# Patient Record
Sex: FEMALE | Race: WHITE
[De-identification: names, ages, dates, MRNs, and addresses within clinical notes are randomized per-mention and may not be internally consistent; named-entity substitution may affect disease eponyms.]

---

## 2017-02-21 ENCOUNTER — HOSPITAL ENCOUNTER (OUTPATIENT)
Dept: HOSPITAL 45 - C.RAD1850 | Age: 82
Discharge: HOME | End: 2017-02-21
Attending: INTERNAL MEDICINE
Payer: COMMERCIAL

## 2017-02-21 DIAGNOSIS — E55.9: Primary | ICD-10-CM

## 2017-02-21 DIAGNOSIS — M62.81: ICD-10-CM

## 2017-02-21 DIAGNOSIS — M15.9: ICD-10-CM

## 2017-02-21 DIAGNOSIS — Z79.52: ICD-10-CM

## 2017-02-21 DIAGNOSIS — M35.3: ICD-10-CM

## 2017-02-21 DIAGNOSIS — R53.81: ICD-10-CM

## 2017-02-21 LAB
BUN SERPL-MCNC: 23 MG/DL (ref 7–18)
CREAT SERPL-MCNC: 1.1 MG/DL (ref 0.6–1.2)

## 2017-02-21 NOTE — DIAGNOSTIC IMAGING REPORT
THORACIC SPINE 3 VIEWS ROUTINE



CLINICAL HISTORY: Polymyalgia rheumatica, long-term steroid use    



COMPARISON STUDY:  No previous studies for comparison.



FINDINGS: There are mild to moderate multilevel degenerative changes. No acute

fractures are visualized. The paraspinal line is not displaced. There is a minor

spinal curvature convex to the right. There is widening of the prevertebral soft

tissues and cervical level. A neck mass cannot be excluded.



IMPRESSION: 

1. Multilevel degenerative change. No acute fractures are visualized on

conventional radiographic imaging. 

2. Widening of the prevertebral soft tissues at the mid cervical level. This

could be indirect evidence of a neck mass. CT scanning could be obtained in

follow-up as deemed clinically indicated.









Electronically signed by:  Kyler Champion M.D.

2/21/2017 9:30 AM



Dictated Date/Time:  2/21/2017 9:25 AM

## 2017-02-22 LAB
ALBUMIN SERPL-MCNC: 3.9 G/DL (ref 3.8–4.8)
BETA-2-GLOBULIN: 0.4 G/DL (ref 0.2–0.5)
ELECTROPHORESIS INTERPRET: (no result)
GAMMA GLOB SERPL ELPH-MCNC: 0.6 G/DL (ref 0.8–1.7)
PROT SERPL-MCNC: 6.6 G/DL (ref 6.2–8.3)

## 2017-02-27 ENCOUNTER — HOSPITAL ENCOUNTER (OUTPATIENT)
Dept: HOSPITAL 45 - C.CTS | Age: 82
Discharge: HOME | End: 2017-02-27
Attending: INTERNAL MEDICINE
Payer: COMMERCIAL

## 2017-02-27 DIAGNOSIS — R22.1: Primary | ICD-10-CM

## 2017-02-27 NOTE — DIAGNOSTIC IMAGING REPORT
CT soft tissue neck SOFT TISSUE NECK WITH



CLINICAL HISTORY: R22.1 Neck [id number] abnormal cervical thoracic images.

Mass.



TECHNIQUE: Transaxial acquisition with multi axial reformatted images



COMPARISON STUDY:  2/21/2017



FINDINGS: Patient salivary glands are unremarkable. This includes parotid and

submandibular glands.



Prevertebral soft tissues showed no evidence for abnormal mass or collection.

The plain film findings most likely are on the basis of technical and/or

positional artifact. Thyroid appears to be symmetric. There is some nodularity

of the superior aspects of the thyroid lobes bilaterally.



There is no significant cervical adenopathy. There is no airway compromise.



IMPRESSION:  Negative study. Soft tissue prominence in the prevertebral mid

cervical region appears to be secondary to overlap or positional artifact. 









Electronically signed by:  Amadou Castro M.D.

2/27/2017 11:47 AM



Dictated Date/Time:  2/27/2017 11:43 AM

## 2017-03-17 ENCOUNTER — HOSPITAL ENCOUNTER (OUTPATIENT)
Dept: HOSPITAL 45 - C.LAB1850 | Age: 82
Discharge: HOME | End: 2017-03-17
Attending: INTERNAL MEDICINE
Payer: COMMERCIAL

## 2017-03-17 DIAGNOSIS — E55.9: Primary | ICD-10-CM

## 2017-03-17 DIAGNOSIS — Z79.52: ICD-10-CM

## 2017-03-17 DIAGNOSIS — R29.898: ICD-10-CM

## 2017-03-17 DIAGNOSIS — M15.9: ICD-10-CM

## 2017-03-17 DIAGNOSIS — M65.88: ICD-10-CM

## 2017-03-17 DIAGNOSIS — R60.9: ICD-10-CM

## 2017-03-17 LAB
ALP SERPL-CCNC: 52 U/L (ref 45–117)
ALT SERPL-CCNC: 14 U/L (ref 12–78)
AST SERPL-CCNC: 8 U/L (ref 15–37)
BASOPHILS # BLD: 0.04 K/UL (ref 0–0.2)
BASOPHILS NFR BLD: 0.3 %
COMPLETE: YES
CREAT SERPL-MCNC: 1.1 MG/DL (ref 0.6–1.2)
EOSINOPHIL NFR BLD AUTO: 371 K/UL (ref 130–400)
HCT VFR BLD CALC: 30.1 % (ref 37–47)
IG%: 0.5 %
IMM GRANULOCYTES NFR BLD AUTO: 5.7 %
LYMPHOCYTES # BLD: 0.85 K/UL (ref 1.2–3.4)
MCH RBC QN AUTO: 26.5 PG (ref 25–34)
MCHC RBC AUTO-ENTMCNC: 31.2 G/DL (ref 32–36)
MCV RBC AUTO: 84.8 FL (ref 80–100)
MONOCYTES NFR BLD: 2.7 %
NEUTROPHILS # BLD AUTO: 0.5 %
NEUTROPHILS NFR BLD AUTO: 90.3 %
PMV BLD AUTO: 10.4 FL (ref 7.4–10.4)
RBC # BLD AUTO: 3.55 M/UL (ref 4.2–5.4)
WBC # BLD AUTO: 14.91 K/UL (ref 4.8–10.8)

## 2017-05-04 ENCOUNTER — HOSPITAL ENCOUNTER (OUTPATIENT)
Dept: HOSPITAL 45 - C.RAD1850 | Age: 82
Discharge: HOME | End: 2017-05-04
Attending: INTERNAL MEDICINE
Payer: COMMERCIAL

## 2017-05-04 DIAGNOSIS — M79.643: ICD-10-CM

## 2017-05-04 DIAGNOSIS — M65.88: ICD-10-CM

## 2017-05-04 DIAGNOSIS — M85.841: ICD-10-CM

## 2017-05-04 DIAGNOSIS — E55.9: Primary | ICD-10-CM

## 2017-05-04 NOTE — DIAGNOSTIC IMAGING REPORT
RIGHT HIP UNILATERAL 2 VIEWS



CLINICAL HISTORY: Right hip pain. Remitting seronegative symmetrical synovitis

with pitting edema.



COMPARISON: CT of the abdomen and pelvis July 11, 2016 and pelvis radiograph

January 20, 2016.



FINDINGS:  Alignment of the right hip is anatomic. There is no acute fracture or

joint space is preserved. There is mild osteophytosis. There is no evidence of

avascular necrosis of the right femoral head on this exam. Vascular

calcification is noted. There are soft tissue calcification of the proximal

right thigh which is chronic.



IMPRESSION: 



1. Mild osteoarthritis of the right hip. No radiographic evidence of avascular

necrosis.



2. No acute fracture.







Electronically signed by:  Dani Tate M.D.

5/4/2017 12:26 PM



Dictated Date/Time:  5/4/2017 12:21 PM

## 2017-05-04 NOTE — DIAGNOSTIC IMAGING REPORT
RIGHT HAND 3 VIEWS



CLINICAL HISTORY: Right hand pain.



FINDINGS: 3 views of the right hand are obtained. No prior studies are available

for comparison at the time of dictation. The skeletal structures are osteopenic.

No fracture is seen. There is evidence of erosive osteoarthritis involving the

distal interphalangeal joints, greatest in the second and fifth digits. Mild

osteoarthritic change is present at the first metacarpophalangeal joint and the

proximal interphalangeal joints. Mild arthritic changes also seen at the

carpometacarpal joint. Moderate arthritic changes seen involving the radial

carpal row. The overlying soft tissues are within normal limits.



IMPRESSION: Osteopenia end arthritic change as above. Erosive arthritis is noted

involving the distal interphalangeal joints.







Electronically signed by:  Erasto Rausch M.D.

5/4/2017 12:23 PM



Dictated Date/Time:  5/4/2017 12:21 PM

## 2017-06-03 ENCOUNTER — HOSPITAL ENCOUNTER (EMERGENCY)
Dept: HOSPITAL 45 - C.EDB | Age: 82
Discharge: HOME | End: 2017-06-03
Payer: COMMERCIAL

## 2017-06-03 VITALS
DIASTOLIC BLOOD PRESSURE: 63 MMHG | SYSTOLIC BLOOD PRESSURE: 149 MMHG | OXYGEN SATURATION: 96 % | TEMPERATURE: 98.96 F | HEART RATE: 77 BPM

## 2017-06-03 VITALS
WEIGHT: 170.42 LBS | BODY MASS INDEX: 33.46 KG/M2 | BODY MASS INDEX: 33.46 KG/M2 | WEIGHT: 170.42 LBS | HEIGHT: 60 IN | HEIGHT: 60 IN

## 2017-06-03 DIAGNOSIS — M19.90: ICD-10-CM

## 2017-06-03 DIAGNOSIS — Z88.3: ICD-10-CM

## 2017-06-03 DIAGNOSIS — I10: ICD-10-CM

## 2017-06-03 DIAGNOSIS — Z79.899: ICD-10-CM

## 2017-06-03 DIAGNOSIS — T50.905A: Primary | ICD-10-CM

## 2017-06-03 DIAGNOSIS — Z88.2: ICD-10-CM

## 2017-06-03 DIAGNOSIS — Z82.49: ICD-10-CM

## 2017-06-03 DIAGNOSIS — M54.14: ICD-10-CM

## 2017-06-03 DIAGNOSIS — D64.9: ICD-10-CM

## 2017-06-03 LAB
ALBUMIN/GLOB SERPL: 1.1 {RATIO} (ref 0.9–2)
ALP SERPL-CCNC: 53 U/L (ref 45–117)
ALT SERPL-CCNC: 12 U/L (ref 12–78)
ANION GAP SERPL CALC-SCNC: 9 MMOL/L (ref 3–11)
APPEARANCE UR: CLEAR
AST SERPL-CCNC: 15 U/L (ref 15–37)
BASOPHILS # BLD: 0.04 K/UL (ref 0–0.2)
BASOPHILS NFR BLD: 0.3 %
BILIRUB UR-MCNC: (no result) MG/DL
BUN SERPL-MCNC: 20 MG/DL (ref 7–18)
BUN/CREAT SERPL: 13.3 (ref 10–20)
CALCIUM SERPL-MCNC: 8.9 MG/DL (ref 8.5–10.1)
CHLORIDE SERPL-SCNC: 92 MMOL/L (ref 98–107)
CO2 SERPL-SCNC: 31 MMOL/L (ref 21–32)
COLOR UR: YELLOW
COMPLETE: YES
CREAT CL PREDICTED SERPL C-G-VRATE: 27.5 ML/MIN
CREAT SERPL-MCNC: 1.5 MG/DL (ref 0.6–1.2)
EOSINOPHIL NFR BLD AUTO: 340 K/UL (ref 130–400)
GLOBULIN SER-MCNC: 3.1 GM/DL (ref 2.5–4)
GLUCOSE SERPL-MCNC: 107 MG/DL (ref 70–99)
HCT VFR BLD CALC: 27.7 % (ref 37–47)
IG%: 0.4 %
IMM GRANULOCYTES NFR BLD AUTO: 5.1 %
LYMPHOCYTES # BLD: 0.64 K/UL (ref 1.2–3.4)
MANUAL MICROSCOPIC REQUIRED?: NO
MCH RBC QN AUTO: 26.5 PG (ref 25–34)
MCHC RBC AUTO-ENTMCNC: 32.5 G/DL (ref 32–36)
MCV RBC AUTO: 81.7 FL (ref 80–100)
MONOCYTES NFR BLD: 5.3 %
NEUTROPHILS # BLD AUTO: 0 %
NEUTROPHILS NFR BLD AUTO: 88.9 %
NITRITE UR QL STRIP: (no result)
PH UR STRIP: 8 [PH] (ref 4.5–7.5)
PMV BLD AUTO: 10.4 FL (ref 7.4–10.4)
POTASSIUM SERPL-SCNC: 3.4 MMOL/L (ref 3.5–5.1)
RBC # BLD AUTO: 3.39 M/UL (ref 4.2–5.4)
REVIEW REQ?: NO
SODIUM SERPL-SCNC: 132 MMOL/L (ref 136–145)
SP GR UR STRIP: 1.01 (ref 1–1.03)
URINE EPITHELIAL CELL AUTO: (no result) /LPF (ref 0–5)
UROBILINOGEN UR-MCNC: (no result) MG/DL
WBC # BLD AUTO: 12.46 K/UL (ref 4.8–10.8)
ZZURINE CULT IF INDIC CATH: YES

## 2017-06-03 NOTE — EMERGENCY ROOM VISIT NOTE
History


Report prepared by Mio:  Violet Turner


Under the Supervision of:  Dr. Emmett Bailey M.D.


First contact with patient:  13:15


Chief Complaint:  ILLNESS


Stated Complaint:  GENERALIZED WEAKNESS/NAUSEA/VOMITING





History of Present Illness


The patient is an 81 year old female who presents to the Emergency Room with 

complaints of a persistent illness that began 1 week ago.  The patient reports 

that she was recently started on medications for her arthritis.  She states 

that she has been feeling generally weak and has noticed bilateral hand 

swelling.  The patient states that she initially started the medication just 

daily, but states that recently she had been taking the medication twice per 

day.  She states that she developed nausea, vomiting and diarrhea and was 

instructed to stop taking the medication until the diarrhea was under control.  

The patient reports that she took the medication yesterday for the first time 

in several days, but states that she has continued to feel ill today.  She 

associates dry heaving today, but denies any current diarrhea.  The patient 

additionally notes a chills, but denies any abdominal pain or fever.  She 

states that she has been trying to keep up with fluid intake, but states that 

she has only been able to take small sips.





   Source of History:  patient


   Onset:  1 week ago


   Position:  other (global)


   Quality:  other (illness)


   Timing:  other (persistent)


   Associated Symptoms:  + chills, + nausea, + vomiting, + diarrhea, No fevers, 

No abdominal pain





Review of Systems


All systems have been listed, reviewed, and are negative other than those 

previously mentioned. Please see Additional Medical History Sheet.





Past Medical & Surgical


Medical Problems:


(1) Acute kidney injury


(2) Dehydration


(3) Diarrhea


(4) Dizziness


(5) DJD (degenerative joint disease)


(6) Hypertension


(7) Hypokalemia


(8) Syncopal episodes


(9) Thoracic radiculopathy








Family History





Heart disease


Hypertension





Social History


Smoking Status:  Never Smoker


Smokeless Tobacco Use:  No


Alcohol Use:  none


Drug Use:  none


Marital Status:  


Housing Status:  lives with significant other


Occupation Status:  retired





Current/Historical Medications


Scheduled


Cholecalciferol (Vitamin D), 5,000 UNITS PO DAILY


Hydrochlorothiazide (Hydrochlorothiazide), 25 MG PO DAILY


Hydroxychloroquine Sulfate (Hydroxychloroquine Sulfat), 200 MG PO BID


Lansoprazole (Prevacid), 30 MG PO DAILY


Levothyroxine Sodium (Synthroid), 75 MCG PO DAILY


Lisinopril (Prinivil), 20 MG PO BID


Melatonin (Kp Melatonin), 6 MG PO HS


Prednisone (Prednisone), 3 MG PO QAM


Prednisone (Prednisone), 5 MG PO DAILY


Sertraline (Zoloft), 25 MG PO HS


Verapamil Hcl (Verapamil Hcl Er), 120 MG PO DAILY





Allergies


Coded Allergies:  


     Sulfa Antibiotics (Verified  Allergy, Intermediate, RASH, 6/3/17)


     Erythromycin (Verified  Adverse Reaction, Unknown, N&V, 6/3/17)


 N&V





Physical Exam


Vital Signs











  Date Time  Temp Pulse Resp B/P (MAP) Pulse Ox O2 Delivery O2 Flow Rate FiO2


 


6/3/17 18:35 37.2 77 18 149/63 96   


 


6/3/17 17:43  78 16  96   


 


6/3/17 17:38  80 16  95   


 


6/3/17 17:33  82 17  97   


 


6/3/17 17:32    149/63    


 


6/3/17 17:28  81 22  94   


 


6/3/17 17:23  84 23  98   


 


6/3/17 17:18  83 18  93   


 


6/3/17 17:13  83 20  94   


 


6/3/17 17:08  81 19  93   


 


6/3/17 17:03  80 17  94   


 


6/3/17 17:02    157/56    


 


6/3/17 16:58  78 16  95   


 


6/3/17 16:53  79 18  94   


 


6/3/17 16:48  78 22  96   


 


6/3/17 16:43  77 15  98   


 


6/3/17 16:38  86 20  95   


 


6/3/17 16:33  87 22  94   


 


6/3/17 16:32    175/82    


 


6/3/17 16:28  92 24  95   


 


6/3/17 16:23  80 16  93   


 


6/3/17 16:18  80 17  97   


 


6/3/17 16:13  81 18  92   


 


6/3/17 16:08  81 18  93   


 


6/3/17 16:03  82 21  89   


 


6/3/17 16:02    162/58    


 


6/3/17 15:58  79 16  93   


 


6/3/17 15:53  82 19  91   


 


6/3/17 15:48  79 21  99   


 


6/3/17 15:43  77 13  98   


 


6/3/17 15:38  81 22  99   


 


6/3/17 15:33  81 19  91   


 


6/3/17 15:32    164/58    


 


6/3/17 15:28  79 20  92   


 


6/3/17 15:23  81 17  91   


 


6/3/17 15:19    159/67    


 


6/3/17 15:18  78 17  96   


 


6/3/17 15:13  86 17  87   


 


6/3/17 15:08  84 17  85   


 


6/3/17 15:03 37.2 91 19  85   


 


6/3/17 14:58  89 19  86   


 


6/3/17 14:53  90 19  81   


 


6/3/17 14:48  85 17  83   


 


6/3/17 14:43  82 15  85   


 


6/3/17 14:38  75 22  97   


 


6/3/17 14:33  75 19  94   


 


6/3/17 14:28  74 18  99   


 


6/3/17 14:23  72 20  94   


 


6/3/17 14:18  70 20  97   


 


6/3/17 14:13  68 17  98   


 


6/3/17 14:08  73 20  100   


 


6/3/17 14:03  72 22  98   


 


6/3/17 13:58  81 23  99   


 


6/3/17 13:53  78 16  100   


 


6/3/17 13:48  68 17 147/70 97   


 


6/3/17 13:43  67 17  98   


 


6/3/17 13:38  70 17  96   


 


6/3/17 13:33  69 17  94   


 


6/3/17 13:28  69 15  98   


 


6/3/17 13:23  70 22  99   


 


6/3/17 13:18  69 19  99   


 


6/3/17 13:13  73 21  96   


 


6/3/17 13:08  69 15  99   


 


6/3/17 13:03  68 21  99   


 


6/3/17 13:02  71      


 


6/3/17 12:47 36.5 70 18 163/102 99 Room Air  


 


6/3/17 12:35    163/102    











Physical Exam


GENERAL: Patient awake, alert, oriented x 3.  Patient appears mildly 

dehydrated.  Patient follows commands.  Patient does not appear toxic.  Patient 

is well-nourished.  


SKIN: No erythema, pallor, cyanosis or rash


HEENT: Normal head, pupils equal, reactive to light and accommodation.  Ears 

normal.  Mucous membranes appear slightly dry.  Oral cavity and posterior 

pharynx appear normal.  Neck: Without adenopathy, no neck vein distention.


LUNGS: Clear to auscultation.  No wheezes, no rales, no rhonchi.


HEART:  No murmurs. No gallops. No rubs


ABDOMEN: Obese, No masses, no rebound, no hepatomegaly or splenomegaly.


EXTREMITIES: Patient has some generalized swelling of hands consistent with 

chronic rheumatoid arthritis, 2+ nonpitting pretibial edema.


NEUROLOGIC: Cranial nerves II-XII within normal limits.  No gross motor sensory 

function deficits.





Medical Decision & Procedures


Laboratory Results


6/3/17 13:10








Red Blood Count 3.39, Mean Corpuscular Volume 81.7, Mean Corpuscular Hemoglobin 

26.5, Mean Corpuscular Hemoglobin Concent 32.5, Mean Platelet Volume 10.4, 

Neutrophils (%) (Auto) 88.9, Lymphocytes (%) (Auto) 5.1, Monocytes (%) (Auto) 

5.3, Eosinophils (%) (Auto) 0.0, Basophils (%) (Auto) 0.3, Neutrophils # (Auto) 

11.07, Lymphocytes # (Auto) 0.64, Monocytes # (Auto) 0.66, Eosinophils # (Auto) 

0.00, Basophils # (Auto) 0.04





6/3/17 13:10

















Test


  6/3/17


13:10 6/3/17


14:00 6/3/17


14:12


 


White Blood Count


  12.46 K/uL


(4.8-10.8) 


  


 


 


Red Blood Count


  3.39 M/uL


(4.2-5.4) 


  


 


 


Hemoglobin


  9.0 g/dL


(12.0-16.0) 


  


 


 


Hematocrit 27.7 % (37-47)   


 


Mean Corpuscular Volume


  81.7 fL


() 


  


 


 


Mean Corpuscular Hemoglobin


  26.5 pg


(25-34) 


  


 


 


Mean Corpuscular Hemoglobin


Concent 32.5 g/dl


(32-36) 


  


 


 


Platelet Count


  340 K/uL


(130-400) 


  


 


 


Mean Platelet Volume


  10.4 fL


(7.4-10.4) 


  


 


 


Neutrophils (%) (Auto) 88.9 %   


 


Lymphocytes (%) (Auto) 5.1 %   


 


Monocytes (%) (Auto) 5.3 %   


 


Eosinophils (%) (Auto) 0.0 %   


 


Basophils (%) (Auto) 0.3 %   


 


Neutrophils # (Auto)


  11.07 K/uL


(1.4-6.5) 


  


 


 


Lymphocytes # (Auto)


  0.64 K/uL


(1.2-3.4) 


  


 


 


Monocytes # (Auto)


  0.66 K/uL


(0.11-0.59) 


  


 


 


Eosinophils # (Auto)


  0.00 K/uL


(0-0.5) 


  


 


 


Basophils # (Auto)


  0.04 K/uL


(0-0.2) 


  


 


 


RDW Standard Deviation


  44.0 fL


(36.4-46.3) 


  


 


 


RDW Coefficient of Variation


  14.6 %


(11.5-14.5) 


  


 


 


Immature Granulocyte % (Auto) 0.4 %   


 


Immature Granulocyte # (Auto)


  0.05 K/uL


(0.00-0.02) 


  


 


 


Anion Gap


  9.0 mmol/L


(3-11) 


  


 


 


Est Creatinine Clear Calc


Drug Dose 27.5 ml/min 


  


  


 


 


Estimated GFR (


American) 37.7 


  


  


 


 


Estimated GFR (Non-


American 32.6 


  


  


 


 


BUN/Creatinine Ratio 13.3 (10-20)   


 


Calcium Level


  8.9 mg/dl


(8.5-10.1) 


  


 


 


Total Bilirubin


  0.4 mg/dl


(0.2-1) 


  


 


 


Aspartate Amino Transf


(AST/SGOT) 15 U/L (15-37) 


  


  


 


 


Alanine Aminotransferase


(ALT/SGPT) 12 U/L (12-78) 


  


  


 


 


Alkaline Phosphatase


  53 U/L


() 


  


 


 


Total Protein


  6.5 gm/dl


(6.4-8.2) 


  


 


 


Albumin


  3.4 gm/dl


(3.4-5.0) 


  


 


 


Globulin


  3.1 gm/dl


(2.5-4.0) 


  


 


 


Albumin/Globulin Ratio 1.1 (0.9-2)   


 


Urine Color  YELLOW  


 


Urine Appearance  CLEAR (CLEAR)  


 


Urine pH  8.0 (4.5-7.5)  


 


Urine Specific Gravity


  


  1.006


(1.000-1.030) 


 


 


Urine Protein  NEG (NEG)  


 


Urine Glucose (UA)  NEG (NEG)  


 


Urine Ketones  NEG (NEG)  


 


Urine Occult Blood  NEG (NEG)  


 


Urine Nitrite  POS (NEG)  


 


Urine Bilirubin  NEG (NEG)  


 


Urine Urobilinogen  NEG (NEG)  


 


Urine Leukocyte Esterase  SMALL (NEG)  


 


Urine WBC (Auto)


  


  5-10 /hpf


(0-5) 


 


 


Urine RBC (Auto)  0-4 /hpf (0-4)  


 


Urine Hyaline Casts (Auto)  0 /lpf (0-5)  


 


Urine Epithelial Cells (Auto)


  


  20-30 /lpf


(0-5) 


 


 


Urine Bacteria (Auto)  4+ (NEG)  


 


Influenza Type A Antigen


  


  


  Neg for Influ


A (NEG)


 


Influenza Type B Antigen


  


  


  Neg for Influ


B (NEG)








Laboratory results as stated above per my review.





Medications Administered











 Medications


  (Trade)  Dose


 Ordered  Sig/Robinson


 Route  Start Time


 Stop Time Status Last Admin


Dose Admin


 


 Sodium Chloride  1,000 ml @ 


 1,000 mls/hr  Q1H ONCE


 IV  6/3/17 13:30


 6/3/17 14:29 DC 6/3/17 13:30


1,000 MLS/HR


 


 Ondansetron HCl


  (Zofran Inj)  4 mg  Q1HWA  PRN


 IV  6/3/17 13:30


 6/3/17 18:55 DC 6/3/17 14:04


4 MG











ECG


Indication:  weakness


Rate (beats per minute):  70


Rhythm:  sinus rhythm


Findings:  no acute ischemic change, no ectopy, other (short ID interval)





ED Course


1316: Past medical records reviewed. The patient was evaluated in room B7. A 

complete history and physical examination was performed. 





1330: Ordered Zofran Inj 4 mg IV, Sodium Chloride 1000 ml @ 1000 mls/hr IV.





1615: I reevaluated the patient and she is feeling better.  She states that her 

nausea has subsided so she is going to try eating and drinking.  





1715: I reevaluated the patient and she is doing well.  I discussed all the 

exam findings with her and I discussed the treatment plan.  She verbalized 

complete understanding and agreement.  She is ready to go home.





Medical Decision


Nurses notes reviewed. Medical history sheet reviewed. Differential diagnosis 

includes but is not limited to: medication reaction, gastritis, gastroenteritis

, dehydration, metabolic disorder.





Medication Reconciliation: I attest that I have personally reviewed the patient'

s current medication list.





Blood Pressure Screening: Patient was found to have an elevated blood pressure 

and was referred to their primary doctor for recheck and further treatment.





The patient was given medication for nausea..  She was able to drink and eat 

prior to discharge.  I believe her symptoms are related to the 

hydroxychloroquine.  Labs, EKG and imaging were evaluated.  Please see above. 

The patient will stop the hydroxychloroquine..  She is to follow up with her 

rheumatologist within the next 3 days.





Impression





 Primary Impression:  


 Medication reaction


 Additional Impression:  


 Anemia





Scribe Attestation


The scribe's documentation has been prepared under my direction and personally 

reviewed by me in its entirety. I confirm that the note above accurately 

reflects all work, treatment, procedures, and medical decision making performed 

by me.





Departure Information


Dispostion


Home / Self-Care





Referrals


García Thomas D.O. (PCP)








Aileen Coppola MD





Forms


HOME CARE DOCUMENTATION FORM,                                                 

               IMPORTANT VISIT INFORMATION





Patient Instructions


My [facility]





Additional Instructions





Stop hydroxychloroquine.


Continue all of your other medications as prescribed. 


Follow up with your rheumatologist on Tuesday. 


Drink extra fluids.





Problem Qualifiers

## 2017-06-05 ENCOUNTER — HOSPITAL ENCOUNTER (INPATIENT)
Dept: HOSPITAL 45 - C.EDC | Age: 82
LOS: 8 days | Discharge: NURSING, REHAB OR LONG-TERM CARE | DRG: 372 | End: 2017-06-13
Attending: INTERNAL MEDICINE | Admitting: HOSPITALIST
Payer: COMMERCIAL

## 2017-06-05 VITALS
SYSTOLIC BLOOD PRESSURE: 156 MMHG | HEART RATE: 67 BPM | TEMPERATURE: 98.06 F | OXYGEN SATURATION: 95 % | DIASTOLIC BLOOD PRESSURE: 82 MMHG

## 2017-06-05 VITALS
HEIGHT: 60 IN | BODY MASS INDEX: 34.5 KG/M2 | WEIGHT: 175.71 LBS | HEIGHT: 60 IN | WEIGHT: 175.71 LBS | BODY MASS INDEX: 34.5 KG/M2

## 2017-06-05 VITALS — OXYGEN SATURATION: 98 %

## 2017-06-05 DIAGNOSIS — E87.6: ICD-10-CM

## 2017-06-05 DIAGNOSIS — K58.0: ICD-10-CM

## 2017-06-05 DIAGNOSIS — F32.9: ICD-10-CM

## 2017-06-05 DIAGNOSIS — K21.9: ICD-10-CM

## 2017-06-05 DIAGNOSIS — Z96.659: ICD-10-CM

## 2017-06-05 DIAGNOSIS — M35.3: ICD-10-CM

## 2017-06-05 DIAGNOSIS — E55.9: ICD-10-CM

## 2017-06-05 DIAGNOSIS — E87.1: ICD-10-CM

## 2017-06-05 DIAGNOSIS — B96.20: ICD-10-CM

## 2017-06-05 DIAGNOSIS — M54.14: ICD-10-CM

## 2017-06-05 DIAGNOSIS — R19.5: ICD-10-CM

## 2017-06-05 DIAGNOSIS — Z90.710: ICD-10-CM

## 2017-06-05 DIAGNOSIS — E66.9: ICD-10-CM

## 2017-06-05 DIAGNOSIS — M06.9: ICD-10-CM

## 2017-06-05 DIAGNOSIS — Z88.2: ICD-10-CM

## 2017-06-05 DIAGNOSIS — Z79.899: ICD-10-CM

## 2017-06-05 DIAGNOSIS — Z82.49: ICD-10-CM

## 2017-06-05 DIAGNOSIS — N39.0: ICD-10-CM

## 2017-06-05 DIAGNOSIS — Z79.52: ICD-10-CM

## 2017-06-05 DIAGNOSIS — Z88.0: ICD-10-CM

## 2017-06-05 DIAGNOSIS — Z66: ICD-10-CM

## 2017-06-05 DIAGNOSIS — T50.905A: ICD-10-CM

## 2017-06-05 DIAGNOSIS — M19.90: ICD-10-CM

## 2017-06-05 DIAGNOSIS — R32: ICD-10-CM

## 2017-06-05 DIAGNOSIS — E03.9: ICD-10-CM

## 2017-06-05 DIAGNOSIS — A04.7: Primary | ICD-10-CM

## 2017-06-05 DIAGNOSIS — D64.9: ICD-10-CM

## 2017-06-05 DIAGNOSIS — Z88.3: ICD-10-CM

## 2017-06-05 DIAGNOSIS — K57.30: ICD-10-CM

## 2017-06-05 DIAGNOSIS — I10: ICD-10-CM

## 2017-06-05 DIAGNOSIS — Z88.1: ICD-10-CM

## 2017-06-05 LAB
ALBUMIN/GLOB SERPL: 1 {RATIO} (ref 0.9–2)
ALP SERPL-CCNC: 54 U/L (ref 45–117)
ALT SERPL-CCNC: 13 U/L (ref 12–78)
ANION GAP SERPL CALC-SCNC: 11 MMOL/L (ref 3–11)
AST SERPL-CCNC: 13 U/L (ref 15–37)
BASOPHILS # BLD: 0.03 K/UL (ref 0–0.2)
BASOPHILS NFR BLD: 0.2 %
BUN SERPL-MCNC: 14 MG/DL (ref 7–18)
BUN/CREAT SERPL: 12.1 (ref 10–20)
CALCIUM SERPL-MCNC: 9.1 MG/DL (ref 8.5–10.1)
CHLORIDE SERPL-SCNC: 91 MMOL/L (ref 98–107)
CO2 SERPL-SCNC: 28 MMOL/L (ref 21–32)
COMPLETE: YES
CREAT CL PREDICTED SERPL C-G-VRATE: 34.9 ML/MIN
CREAT SERPL-MCNC: 1.2 MG/DL (ref 0.6–1.2)
EOSINOPHIL NFR BLD AUTO: 344 K/UL (ref 130–400)
GLOBULIN SER-MCNC: 3.4 GM/DL (ref 2.5–4)
GLUCOSE SERPL-MCNC: 112 MG/DL (ref 70–99)
HCT VFR BLD CALC: 29.3 % (ref 37–47)
IG%: 0.2 %
IMM GRANULOCYTES NFR BLD AUTO: 3.9 %
INR PPP: 1 (ref 0.9–1.1)
LYMPHOCYTES # BLD: 0.5 K/UL (ref 1.2–3.4)
MAGNESIUM SERPL-MCNC: 1.8 MG/DL (ref 1.8–2.4)
MCH RBC QN AUTO: 26.4 PG (ref 25–34)
MCHC RBC AUTO-ENTMCNC: 32.4 G/DL (ref 32–36)
MCV RBC AUTO: 81.4 FL (ref 80–100)
MONOCYTES NFR BLD: 4.9 %
NEUTROPHILS # BLD AUTO: 0 %
NEUTROPHILS NFR BLD AUTO: 90.8 %
PARTIAL THROMBOPLASTIN RATIO: 1.1
PMV BLD AUTO: 10.1 FL (ref 7.4–10.4)
POTASSIUM SERPL-SCNC: 3.3 MMOL/L (ref 3.5–5.1)
PROTHROMBIN TIME: 10.9 SECONDS (ref 9–12)
RBC # BLD AUTO: 3.6 M/UL (ref 4.2–5.4)
SODIUM SERPL-SCNC: 130 MMOL/L (ref 136–145)
WBC # BLD AUTO: 12.92 K/UL (ref 4.8–10.8)

## 2017-06-05 RX ADMIN — SERTRALINE HYDROCHLORIDE SCH MG: 50 TABLET, FILM COATED ORAL at 21:50

## 2017-06-05 RX ADMIN — SODIUM CHLORIDE SCH MLS/HR: 900 INJECTION, SOLUTION INTRAVENOUS at 19:37

## 2017-06-05 RX ADMIN — LISINOPRIL SCH MG: 20 TABLET ORAL at 21:50

## 2017-06-05 RX ADMIN — HEPARIN SODIUM SCH UNIT: 10000 INJECTION, SOLUTION INTRAVENOUS; SUBCUTANEOUS at 21:52

## 2017-06-05 NOTE — DIAGNOSTIC IMAGING REPORT
CT SCAN OF THE ABDOMEN AND PELVIS WITHOUT IV CONTRAST



CLINICAL HISTORY:   Nausea and vomiting. Diarrhea.



COMPARISON STUDY:  Abdominal CT dated 7/11/2016.



TECHNIQUE: CT scan of the abdomen and pelvis is performed from the lung bases to

the proximal femora. Images are reviewed in the axial, sagittal, and coronal

planes. IV contrast was not administered for this examination as per the

referring clinician. Note that examination is significant suboptimal without

oral and IV contrast. The examination is also degraded by motion artifact.

Automated dose control exposure was utilized.



CT DOSE: 938.09 mGy.cm



FINDINGS:



Lung bases: The heart is top normal in size and without pericardial effusion.

There is diminished attenuation of the cardiac blood pool as compared to the

myocardium suggesting anemia. Coronary artery calcifications are noted. The lung

bases are clear.



Liver: The unenhanced liver is normal in size, contour, and attenuation. There

is no intrahepatic biliary ductal dilatation.



Gallbladder: Unremarkable.



Spleen: Normal in size and attenuation.



Pancreas: There is fatty atrophy of the pancreas which is grossly unremarkable.



Adrenal glands: Unremarkable.



Kidneys: The unenhanced kidneys are atrophic and without hydronephrosis. There

are no renal calculi identified. There is no evidence of contour deforming renal

mass lesion.



Abdominal vasculature: The abdominal aorta is normal in course and caliber

noting moderate atherosclerotic calcification.



Bowel: There is no bowel obstruction. There is moderate sigmoid diverticulosis

without CT evidence of acute diverticulitis. Submucosal fat deposition is

incidentally noted in the cecum. The appendix is  well-visualized and normal.



Peritoneum: There is no intraperitoneal free air or abdominal ascites.



Lymphadenopathy: None.



Pelvic viscera: The bladder is normal as visualized. The uterus is surgically

absent. No adnexal lesion is seen. There is nonspecific induration of the

perianal soft tissues.



Skeletal structures: The skeletal structures are osteopenic. No lytic or blastic

lesions are seen.





IMPRESSION:



1. Suboptimal examination without oral and IV contrast.



2. There are no acute infectious or inflammatory findings in the abdomen or

pelvis.



3. There is nonspecific induration of the perianal soft tissues. This is of

indeterminant etiology and significance. Correlation with physical examination

findings is recommended.



4. Moderate sigmoid diverticulosis without CT evidence of acute diverticulitis.







Electronically signed by:  Erasto Rausch M.D.

6/5/2017 3:01 PM



Dictated Date/Time:  6/5/2017 2:55 PM

## 2017-06-05 NOTE — EMERGENCY ROOM VISIT NOTE
History


First contact with patient:  13:37


Chief Complaint:  ILLNESS


Stated Complaint:  N/V/D





History of Present Illness


Patient is an 81-year-old white female with past medical history including GERD

, hypothyroidism, hypertension, depression, PMR, degenerative joint disease, 

arthritis, vitamin D deficiency, chronic urinary incontinence, among others, 

who is brought back to the emergency department by ALS ambulance from home for 

evaluation of nausea, vomiting, diarrhea and weakness 2 days.  Patient was 

seen here 2 days ago for the same complaint.  Patient provides the history, is 

supplemented thumb by the daughter.  She has been having trouble for several 

weeks, they thought it was related to a new medication, hydroxychloroquine, 

which was started for her arthritic process.  She had started it last week, and 

developed some GI symptoms which were alleviated when she stopped the 

medication.  She took it again last Friday, and again developed nausea, 

vomiting and diarrhea for which she was seen in the emergency department 

Saturday, 6/3.  She was evaluated with laboratory studies, urinalysis and an EKG

, which apparently were unremarkable, and the patient reports that she did not 

feel better upon discharge from our facility.  She was too weak to be able to 

care for herself at home, cannot get out of bed or get to a bathroom without 

significant assistance.  She did feel a little bit better yesterday and was 

able to eat half of the grill cheese sandwich, some mashed potatoes and pasta 

but did feel nauseous after dinner.  Today, she had yogurt and part of a bagel, 

then promptly vomited and had diarrhea 2.  This occurred about 3 hours ago.  

She denies any abdominal pain or distention.  She does have a history of 

diverticulosis, but has never had a bowel obstruction.  She has chronic urinary 

incontinence, wears pads and diapers, and denies any changes in this although 

family does note a small foul-smelling urine.  She has been referred to urology 

for the incontinence and cannot get in until next month.  She has not had any 

fevers.  She denies any back or flank pain.  No chest pain, palpitations or 

shortness of breath.  She has not been on any antibiotics recently.  No sick 

contacts at home.  They have well water with filters as a source at home.  She 

was scheduled to see Dr. Coppola, her rheumatologist in follow-up tomorrow.





Review of Systems


Review of systems as per HPI.  All other systems reviewed were negative.  10 

systems reviewed.





Past Medical/Surgical History


Medical Problems:


(1) Acute kidney injury


(2) Altered mental status


(3) Anemia


(4) Dehydration


(5) Diarrhea


(6) Diverticulosis Colon (W/O Ment Of Hemorrhage)


(7) Dizziness


(8) DJD (degenerative joint disease)


(9) Esophageal Reflux


(10) Headache


(11) Hypertension


(12) Hypokalemia


(13) Hypomagnesemia


(14) Hyponatremia


(15) Hypothyroidism, Unspecified


(16) Irritable Bowel Syndrome


(17) Long-Term(Current)Use Of Steroids


(18) Medication reaction


(19) Nausea & vomiting


(20) Polymyalgia Rheumatica


(21) Renal insufficiency


(22) Rheumatoid Arthritis, Unspecified


(23) SIRS (systemic inflammatory response syndrome)


(24) Syncopal episodes


(25) Syncope


(26) Thoracic radiculopathy


(27) Unspecified Urinary Incontinence


(28) Vitamin D Deficiency, Unspecified


(29) Weakness


Surgical Problems:


(1) History of hysterectomy


(2) Knee Joint Replacement Status


Electronic medical records are reviewed and summarized as above/below.  See 

Problem List.





Family History





Heart disease


Hypertension





Social History


Smoking Status:  Never Smoker


Alcohol Use:  none


Drug Use:  none


Marital Status:  


Housing Status:  lives with family


Occupation Status:  retired





Current/Historical Medications


Scheduled


Cholecalciferol (Vitamin D), 5,000 UNITS PO DAILY


Hydrochlorothiazide (Hydrochlorothiazide), 25 MG PO DAILY


Hydroxychloroquine Sulfate (Hydroxychloroquine Sulfat), 200 MG PO BID


Lansoprazole (Prevacid), 30 MG PO DAILY


Levothyroxine Sodium (Synthroid), 75 MCG PO DAILY


Lisinopril (Prinivil), 20 MG PO BID


Melatonin (Kp Melatonin), 6 MG PO HS


Prednisone (Prednisone), 3 MG PO QAM


Prednisone (Prednisone), 5 MG PO DAILY


Sertraline (Zoloft), 25 MG PO HS


Verapamil Hcl (Verapamil Hcl Er), 120 MG PO DAILY





Allergies


Coded Allergies:  


     Sulfa Antibiotics (Verified  Allergy, Intermediate, RASH, 6/5/17)


     Erythromycin (Verified  Adverse Reaction, Unknown, N&V, 6/5/17)


 N&V





Physical Exam


Vital Signs











  Date Time  Temp Pulse Resp B/P (MAP) Pulse Ox O2 Delivery O2 Flow Rate FiO2


 


6/5/17 17:01     98 Room Air  


 


6/5/17 16:25  70 16 149/87 98 Room Air  


 


6/5/17 15:25  65      


 


6/5/17 15:15  66 16 150/91    


 


6/5/17 13:17 36.5 74 18 135/65 98 Room Air  











Physical Exam


CONSTITUTIONAL: Patient is a well-appearing 81-year-old white female who is 

awake and alert and in no acute distress.  Vital signs are stable.


EYES:  Pupils equal, round, reactive to light and accommodation.  EOMs intact 

without nystagmus.  Sclera are anicteric. 


ENT:  Tympanic membranes intact, with normal landmarks.  External canals are 

clear.  Oral and nasopharynx are clear.  Mucous membranes are moist, no lesions

, tongue and gums appear normal.     


CARDIOVASCULAR: Regular rate and rhythm, with normal S1 and S2, no murmur or 

gallop or rub is heard.  No carotid bruits auscultated.  No JVD.  Peripheral 

pulses easy to palpable.


RESPIRATORY: Breath sounds equal and clear to auscultation without wheezes, 

rales, or rhonchi heard.   Full and equal chest expansion without accessory 

muscle use or retractions. 


GI: Bowel sounds are present.  Abdomen is soft, nontender, nondistended.  No 

organomegaly.  No pulsatile masses.  No guarding or rebound.


MUSCULOSKELETAL: Full range of motion of extremities x 4 with good strength.  

No cyanosis, edema, joint tenderness or swelling.  No deformity.


INTEGUMENTARY: No lesions or rash, normal skin turgor. 


NEUROLOGICAL: Alert, oriented, and cooperative.  Cranial nerves, sensation and 

strength grossly intact.  Pupils round, equal, and react to light, EOMs are 

full.


LYMPH: No lymphadenopathy.





Medical Decision & Procedures


ER Provider


Diagnostic Interpretation:


CT SCAN OF THE ABDOMEN AND PELVIS WITHOUT IV CONTRAST





CLINICAL HISTORY:   Nausea and vomiting. Diarrhea.





COMPARISON STUDY:  Abdominal CT dated 7/11/2016.





TECHNIQUE: CT scan of the abdomen and pelvis is performed from the lung bases to


the proximal femora. Images are reviewed in the axial, sagittal, and coronal


planes. IV contrast was not administered for this examination as per the


referring clinician. Note that examination is significant suboptimal without


oral and IV contrast. The examination is also degraded by motion artifact.


Automated dose control exposure was utilized.





CT DOSE: 938.09 mGy.cm





FINDINGS:





Lung bases: The heart is top normal in size and without pericardial effusion.


There is diminished attenuation of the cardiac blood pool as compared to the


myocardium suggesting anemia. Coronary artery calcifications are noted. The lung


bases are clear.





Liver: The unenhanced liver is normal in size, contour, and attenuation. There


is no intrahepatic biliary ductal dilatation.





Gallbladder: Unremarkable.





Spleen: Normal in size and attenuation.





Pancreas: There is fatty atrophy of the pancreas which is grossly unremarkable.





Adrenal glands: Unremarkable.





Kidneys: The unenhanced kidneys are atrophic and without hydronephrosis. There


are no renal calculi identified. There is no evidence of contour deforming renal


mass lesion.





Abdominal vasculature: The abdominal aorta is normal in course and caliber


noting moderate atherosclerotic calcification.





Bowel: There is no bowel obstruction. There is moderate sigmoid diverticulosis


without CT evidence of acute diverticulitis. Submucosal fat deposition is


incidentally noted in the cecum. The appendix is  well-visualized and normal.





Peritoneum: There is no intraperitoneal free air or abdominal ascites.





Lymphadenopathy: None.





Pelvic viscera: The bladder is normal as visualized. The uterus is surgically


absent. No adnexal lesion is seen. There is nonspecific induration of the


perianal soft tissues.





Skeletal structures: The skeletal structures are osteopenic. No lytic or blastic


lesions are seen.








IMPRESSION:





1. Suboptimal examination without oral and IV contrast.





2. There are no acute infectious or inflammatory findings in the abdomen or


pelvis.





3. There is nonspecific induration of the perianal soft tissues. This is of


indeterminant etiology and significance. Correlation with physical examination


findings is recommended.





4. Moderate sigmoid diverticulosis without CT evidence of acute diverticulitis.





Laboratory Results


6/5/17 14:20








Red Blood Count 3.60, Mean Corpuscular Volume 81.4, Mean Corpuscular Hemoglobin 

26.4, Mean Corpuscular Hemoglobin Concent 32.4, Mean Platelet Volume 10.1, 

Neutrophils (%) (Auto) 90.8, Lymphocytes (%) (Auto) 3.9, Monocytes (%) (Auto) 

4.9, Eosinophils (%) (Auto) 0.0, Basophils (%) (Auto) 0.2, Neutrophils # (Auto) 

11.73, Lymphocytes # (Auto) 0.50, Monocytes # (Auto) 0.63, Eosinophils # (Auto) 

0.00, Basophils # (Auto) 0.03





6/5/17 14:20

















Test


  6/5/17


14:20


 


White Blood Count


  12.92 K/uL


(4.8-10.8)


 


Red Blood Count


  3.60 M/uL


(4.2-5.4)


 


Hemoglobin


  9.5 g/dL


(12.0-16.0)


 


Hematocrit 29.3 % (37-47) 


 


Mean Corpuscular Volume


  81.4 fL


()


 


Mean Corpuscular Hemoglobin


  26.4 pg


(25-34)


 


Mean Corpuscular Hemoglobin


Concent 32.4 g/dl


(32-36)


 


Platelet Count


  344 K/uL


(130-400)


 


Mean Platelet Volume


  10.1 fL


(7.4-10.4)


 


Neutrophils (%) (Auto) 90.8 % 


 


Lymphocytes (%) (Auto) 3.9 % 


 


Monocytes (%) (Auto) 4.9 % 


 


Eosinophils (%) (Auto) 0.0 % 


 


Basophils (%) (Auto) 0.2 % 


 


Neutrophils # (Auto)


  11.73 K/uL


(1.4-6.5)


 


Lymphocytes # (Auto)


  0.50 K/uL


(1.2-3.4)


 


Monocytes # (Auto)


  0.63 K/uL


(0.11-0.59)


 


Eosinophils # (Auto)


  0.00 K/uL


(0-0.5)


 


Basophils # (Auto)


  0.03 K/uL


(0-0.2)


 


RDW Standard Deviation


  43.5 fL


(36.4-46.3)


 


RDW Coefficient of Variation


  14.6 %


(11.5-14.5)


 


Immature Granulocyte % (Auto) 0.2 % 


 


Immature Granulocyte # (Auto)


  0.03 K/uL


(0.00-0.02)


 


Prothrombin Time


  10.9 SECONDS


(9.0-12.0)


 


Prothromb Time International


Ratio 1.0 (0.9-1.1) 


 


 


Activated Partial


Thromboplast Time 29.1 SECONDS


(21.0-31.0)


 


Partial Thromboplastin Ratio 1.1 


 


Anion Gap


  11.0 mmol/L


(3-11)


 


Est Creatinine Clear Calc


Drug Dose 34.9 ml/min 


 


 


Estimated GFR (


American) 49.4 


 


 


Estimated GFR (Non-


American 42.7 


 


 


BUN/Creatinine Ratio 12.1 (10-20) 


 


Calcium Level


  9.1 mg/dl


(8.5-10.1)


 


Magnesium Level


  1.8 mg/dl


(1.8-2.4)


 


Total Bilirubin


  0.4 mg/dl


(0.2-1)


 


Aspartate Amino Transf


(AST/SGOT) 13 U/L (15-37) 


 


 


Alanine Aminotransferase


(ALT/SGPT) 13 U/L (12-78) 


 


 


Alkaline Phosphatase


  54 U/L


()


 


Total Protein


  6.7 gm/dl


(6.4-8.2)


 


Albumin


  3.3 gm/dl


(3.4-5.0)


 


Globulin


  3.4 gm/dl


(2.5-4.0)


 


Albumin/Globulin Ratio 1.0 (0.9-2) 


 


Lipase


  105 U/L


()











Medications Administered











 Medications


  (Trade)  Dose


 Ordered  Sig/Robinson


 Route  Start Time


 Stop Time Status Last Admin


Dose Admin


 


 Sodium Chloride  500 ml @ 


 999 mls/hr  Q31M STAT


 IV  6/5/17 14:01


 6/5/17 14:31 DC 6/5/17 14:40


999 MLS/HR


 


 Sodium Chloride  1,000 ml @ 


 250 mls/hr  Q4H STAT


 IV  6/5/17 14:01


 6/5/17 18:00 DC 6/5/17 15:19


250 MLS/HR


 


 Ceftriaxone Sodium


  (Rocephin Inj)  1 gm  NOW  STAT


 IV  6/5/17 14:12


 6/5/17 14:13 DC 6/5/17 15:19


1 GM


 


 Potassium Chloride


  (Klor-Con Tab)  40 meq  NOW  ONCE


 PO  6/5/17 18:00


 6/5/17 19:21 DC 6/5/17 19:38


40 MEQ


 


 Sodium Chloride  1,000 ml @ 


 125 mls/hr  Q8H


 IV  6/5/17 18:00


 7/5/17 17:59  6/5/17 19:37


125 MLS/HR











ED Course


The patient was seen and assessed as above.  Her old records were reviewed, 

including her ED visit from 2 days ago.  Her urine culture from that time is 

growing Escherichia coli and a gram-negative margie.  This was not treated.





IV lock was initiated.  Patient was hydrated with normal saline solution.  She 

was given 1 g of Rocephin IV.  Laboratory studies were collected including CBC 

with differential CMP, magnesium, and lipase.  The patient was unable to give a 

stool for analysis.  Urinalysis was not repeated as it had just been performed 

2 days ago and she has a positive urine culture.  Given her persistent vomiting 

and diarrhea, CT scan of the abdomen pelvis with no contrast was ordered.





Laboratory studies today revealed a white count of 12,900, consistent with 

prior labs.  Stable anemia, H&H 9.5 and 29.3.  Electrolytes revealed a sodium 

of 130, potassium 3.3, chloride 91, carbon dioxide 28, BUN 14 and creatinine 

1.2.  Liver functions are not elevated.  Magnesium is normal.  Lipase is not 

indicative of acute pancreatitis.





CT scan of the abdomen and pelvis without contrast did not identify any acute 

infectious or inflammatory findings.  There was evidence for sigmoid IV 

particular cyst without evidence for acute diverticulitis.





The patient was reassessed.  She had no further diarrhea or vomiting while in 

the emergency department.  All laboratory and diagnostic imaging studies were 

reviewed with attending physician, and discussed with the ED case manager.  

Given her present vomiting and diarrheal illness in addition to the pain and 

disability from her arthritic condition, the patient is unable to care for 

herself at home, even with assistance of family members.  It was not felt that 

she could be safely discharged home.  Patient was discussed with the [facility] Hospitalist Service for further care and management.





Medical Decision


Differential diagnoses entertained included UTI, cystitis, pyelonephritis, 

renal colic, viral illness, infectious versus inflammatory colitis/enteritis, 

bowel obstruction, perforation, abscess, medication side effect, among others.





Impression





 Primary Impression:  


 Vomiting and diarrhea


 Additional Impressions:  


 Urinary tract infection


 Weakness





Departure Information


Referrals


García Thomas D.O. (PCP)





Patient Instructions


[facility]





Problem Qualifiers

## 2017-06-05 NOTE — HISTORY AND PHYSICAL
History & Physical


Date & Time of Service:


Jun 5, 2017 at 18:08


Chief Complaint:


N/V/D


Primary Care Physician:


García Thomas D.OCarmen


History of Present Illness


Source:  patient, spouse ( at bedside), clinic records, hospital records


This is an 82 y/o female with a history of PMR, RS3PE syndrome, inflammatory 

symmetrical polyarthritis, hypertension, hypothyroidism, irritable bowel 

syndrome, and chronic reflux esophagitis who presented to the ED on 6/5 with 

nausea, vomiting, diarrhea, and weakness.  The patient states that she's been 

having nausea, vomiting, and diarrhea for the last 2 weeks.  She was recently 

started on hydroxychloroquine for her inflammatory arthritis by her 

rheumatologist, Dr. Coppola.  The patient was initially started on 200 mg once 

daily for 1 week, and then her dose was increased to 200 mg BID.  The patient 

had been able to tolerate the lower dosing and saw improvement in her symptoms 

however after increasing her dosage she began to develop N/V/D.  She then 

called Dr. Coppola who advised that she stop the medication until her symptoms 

were under control and then restart at the lower dosage.  The patient stopped 

the medication on May 30 and was feeling better.  She took one dose on June 2 

and then experienced recurrence of her N/V/D.  The last 2 days the patient has 

been feeling very weak in addition to her previous symptoms.  The patient has 

not been able to take care of herself due to her weakness.  The patient also 

complains of swelling and joint pain in her hands and wrists that also limits 

her ability to care of herself.  When the swelling is severe, she also 

complains of numbness and tingling in her hands.  The patient denies fevers, 

chills, sweats, chest pain, palpitations, claudication, cough, wheezing, 

shortness of breath, abdominal pain, dysuria, hematuria, urinary retention, 

paralysis.





Past Medical/Surgical History


Medical Problems:


(1) Acute kidney injury


Status: Resolved  





(2) Altered mental status


Status: Resolved  





(3) Anemia


Status: Chronic  





(4) Dehydration


Status: Resolved  





(5) Diarrhea


Status: Resolved  





(6) Diverticulosis Colon (W/O Ment Of Hemorrhage)


Status: Chronic  





(7) Dizziness


Status: Resolved  





(8) DJD (degenerative joint disease)


Status: Chronic  





(9) Esophageal Reflux


Status: Chronic  





(10) Headache


Status: Resolved  





(11) Hypertension


Status: Chronic  





(12) Hypokalemia


Status: Resolved  





(13) Hypomagnesemia


Status: Resolved  





(14) Hyponatremia


Status: Resolved  





(15) Hypothyroidism, Unspecified


Status: Chronic  





(16) Irritable Bowel Syndrome


Status: Chronic  





(17) Long-Term(Current)Use Of Steroids


Status: Chronic  





(18) Medication reaction


Status: Resolved  





(19) Polymyalgia Rheumatica


Status: Chronic  





(20) Renal insufficiency


Status: Resolved  





(21) Rheumatoid Arthritis, Unspecified


Status: Chronic  





(22) SIRS (systemic inflammatory response syndrome)


Status: Resolved  





(23) Syncopal episodes


Status: Resolved  





(24) Syncope


Status: Resolved  





(25) Thoracic radiculopathy


Status: Resolved  





(26) Unspecified Urinary Incontinence


Status: Chronic  





(27) Vitamin D Deficiency, Unspecified


Status: Chronic  





Surgical Problems:


(1) History of hysterectomy


Status: Resolved  





(2) Knee Joint Replacement Status


Status: Resolved  











Family History





Heart disease


Hypertension





Social History


Smoking Status:  Never Smoker


Smokeless Tobacco Use:  No


Alcohol Use:  none


Drug Use:  none


Marital Status:  


Housing status:  lives with significant other


Occupational Status:  retired





Immunizations


History of Influenza Vaccine:  Yes


History of Tetanus Vaccine?:  Yes


Tetanus Immunization Date:  Jul 9, 2008


History of Pneumococcal:  Yes


Pneumococcal Date:  Jun 9, 2004


History of Hepatitis B Vaccine:  No





Multi-Drug Resistant Organisms


History of MDRO:  No





Allergies


Coded Allergies:  


     Sulfa Antibiotics (Verified  Allergy, Intermediate, RASH, 6/5/17)


     Erythromycin (Verified  Adverse Reaction, Unknown, N&V, 6/5/17)


 N&V





Home Medications


Scheduled


Cholecalciferol (Vitamin D), 5,000 UNITS PO DAILY


Hydrochlorothiazide (Hydrochlorothiazide), 25 MG PO DAILY


Hydroxychloroquine Sulfate (Hydroxychloroquine Sulfat), 200 MG PO BID


Lansoprazole (Prevacid), 30 MG PO DAILY


Levothyroxine Sodium (Synthroid), 75 MCG PO DAILY


Lisinopril (Prinivil), 20 MG PO BID


Melatonin (Kp Melatonin), 6 MG PO HS


Prednisone (Prednisone), 3 MG PO QAM


Prednisone (Prednisone), 5 MG PO DAILY


Sertraline (Zoloft), 25 MG PO HS


Verapamil Hcl (Verapamil Hcl Er), 120 MG PO DAILY





Review of Systems


Constitutional:  + weakness, + fatigue, No fever, No chills, No sweats


Eyes:  No worsening of vision, No eye pain, No diplopia


ENT:  No sore throat, No trouble swallowing


Respiratory:  No cough, No wheezing, No shortness of breath


Cardiovascular:  No chest pain, No claudication, No palpitations


Abdomen:  + nausea, + vomiting, + diarrhea, No pain, No GI bleeding


Musculoskeletal:  + joint pain, + swelling, No calf pain


Genitourinary - Female:  + urinary incontinence (chronic), No dysuria, No 

hematuria


Neurologic:  + numbness/tingling, No paralysis, No weakness


Integumentary:  No rash, No itch, No color change





Physical Exam


Vital Signs











  Date Time  Temp Pulse Resp B/P (MAP) Pulse Ox O2 Delivery O2 Flow Rate FiO2


 


6/5/17 17:01     98 Room Air  


 


6/5/17 16:25  70 16 149/87 98 Room Air  


 


6/5/17 15:25  65      


 


6/5/17 15:15  66 16 150/91    


 


6/5/17 13:17 36.5 74 18 135/65 98 Room Air  








General appearance:  +Obese.  Well-developed, well-nourished, no apparent 

distress


Head:  Normocephalic, atraumatic


Eyes:  Normal inspection, PERRL, EOMI


ENT:  Normal ENT inspection, hearing grossly normal, pharynx normal


Neck:  Supple, no JVD, trachea midline


Respiratory/Chest:  Lungs clear to auscultation, normal breath sounds, no 

respiratory distress


Cardiovascular:  Regular rate & rhythm, no gallop, no murmur


Abdomen/GI:  +Hypoactive bowel sounds.  Non-tender, soft


Extremities/Musculoskeletal:  +Edema of hands/fingers bilaterally.  Limited ROM 

of fingers secondary to swelling.  Normal inspection, no calf tenderness


Skin:  Normal color, warm/dry, no rash





Diagnostics


Laboratory Results





Results Past 24 Hours








Test


  6/5/17


14:20 Range/Units


 


 


White Blood Count 12.92 4.8-10.8  K/uL


 


Red Blood Count 3.60 4.2-5.4  M/uL


 


Hemoglobin 9.5 12.0-16.0  g/dL


 


Hematocrit 29.3 37-47  %


 


Mean Corpuscular Volume 81.4   fL


 


Mean Corpuscular Hemoglobin 26.4 25-34  pg


 


Mean Corpuscular Hemoglobin


Concent 32.4


  32-36  g/dl


 


 


Platelet Count 344 130-400  K/uL


 


Mean Platelet Volume 10.1 7.4-10.4  fL


 


Neutrophils (%) (Auto) 90.8  %


 


Lymphocytes (%) (Auto) 3.9  %


 


Monocytes (%) (Auto) 4.9  %


 


Eosinophils (%) (Auto) 0.0  %


 


Basophils (%) (Auto) 0.2  %


 


Neutrophils # (Auto) 11.73 1.4-6.5  K/uL


 


Lymphocytes # (Auto) 0.50 1.2-3.4  K/uL


 


Monocytes # (Auto) 0.63 0.11-0.59  K/uL


 


Eosinophils # (Auto) 0.00 0-0.5  K/uL


 


Basophils # (Auto) 0.03 0-0.2  K/uL


 


RDW Standard Deviation 43.5 36.4-46.3  fL


 


RDW Coefficient of Variation 14.6 11.5-14.5  %


 


Immature Granulocyte % (Auto) 0.2  %


 


Immature Granulocyte # (Auto) 0.03 0.00-0.02  K/uL


 


Sodium Level 130 136-145  mmol/L


 


Potassium Level 3.3 3.5-5.1  mmol/L


 


Chloride Level 91   mmol/L


 


Carbon Dioxide Level 28 21-32  mmol/L


 


Anion Gap 11.0 3-11  mmol/L


 


Blood Urea Nitrogen 14 7-18  mg/dl


 


Creatinine


  1.20


  0.60-1.20


mg/dl


 


Est Creatinine Clear Calc


Drug Dose 34.9


   ml/min


 


 


Estimated GFR (


American) 49.4


   


 


 


Estimated GFR (Non-


American 42.7


   


 


 


BUN/Creatinine Ratio 12.1 10-20  


 


Random Glucose 112 70-99  mg/dl


 


Calcium Level 9.1 8.5-10.1  mg/dl


 


Magnesium Level 1.8 1.8-2.4  mg/dl


 


Total Bilirubin 0.4 0.2-1  mg/dl


 


Aspartate Amino Transf


(AST/SGOT) 13


  15-37  U/L


 


 


Alanine Aminotransferase


(ALT/SGPT) 13


  12-78  U/L


 


 


Alkaline Phosphatase 54   U/L


 


Total Protein 6.7 6.4-8.2  gm/dl


 


Albumin 3.3 3.4-5.0  gm/dl


 


Globulin 3.4 2.5-4.0  gm/dl


 


Albumin/Globulin Ratio 1.0 0.9-2  


 


Lipase 105   U/L











Diagnostic Radiology


Reviewed the following studies and agree with interpretation as follows:








                                                                               

                                                                 


Patient Name: SABINO MARTINS                               Unit Number:  

[id number]                  


 








 











Dictated: 06/05/17 1455


 


Transcribed: 06/05/17 1455


 


EV


 


Printed Date/Time: [~ rep prt dt]/[~ rep prt tm]








 [~ rep ct labl] - [~ rep ct ivnm]


 





   [facility]


 Radiology Department


 [place] [address]


 [phone]





 











Dictated: 06/05/17 1455


 


Transcribed: 06/05/17 1455


 


EV


 


Printed Date/Time: [~ rep prt dt]/[~ rep prt tm]








 [~ rep ct labl] - [~ rep ct ivnm]


 





 











Patient:  SABINO MARTINS Address1:  [address] [place] [address] Rec:  [id number] Address2:  


 


Acct ID:  [id number] [facility] Zip:  [place] [address]


 


YOB: 1935          Sex:  F Room/Bed:  


 


Ref Phy:  García Thomas D.O. SC: MAICOL


 


Att Phy:   Report #:  [id number]


 


Tricia Phy:  García Thomas D.O. Test:  APWO


 


Admit Phy:   Technician:  [facility]


 


Interpreting Phy:  Erasto Rausch M.D. Diagnosis:  N/V/D


 


Ordering Phy:  Addis Peres Service Date:  06/05/17


 


Admit Date: 06/05/17 MNE: [id number]


 


 CONF:


 


 DICTATED BY: Erasto Rausch M.D.]]


 


CC: García Thomas D.O. Burton, Ryan M., Addis Barry PA


 


 Endcc:











[~ rep ct add3]]


CT SCAN OF THE ABDOMEN AND PELVIS WITHOUT IV CONTRAST





CLINICAL HISTORY:   Nausea and vomiting. Diarrhea.





COMPARISON STUDY:  Abdominal CT dated 7/11/2016.





TECHNIQUE: CT scan of the abdomen and pelvis is performed from the lung bases to


the proximal femora. Images are reviewed in the axial, sagittal, and coronal


planes. IV contrast was not administered for this examination as per the


referring clinician. Note that examination is significant suboptimal without


oral and IV contrast. The examination is also degraded by motion artifact.


Automated dose control exposure was utilized.





CT DOSE: 938.09 mGy.cm





FINDINGS:





Lung bases: The heart is top normal in size and without pericardial effusion.


There is diminished attenuation of the cardiac blood pool as compared to the


myocardium suggesting anemia. Coronary artery calcifications are noted. The lung


bases are clear.





Liver: The unenhanced liver is normal in size, contour, and attenuation. There


is no intrahepatic biliary ductal dilatation.





Gallbladder: Unremarkable.





Spleen: Normal in size and attenuation.





Pancreas: There is fatty atrophy of the pancreas which is grossly unremarkable.





Adrenal glands: Unremarkable.





Kidneys: The unenhanced kidneys are atrophic and without hydronephrosis. There


are no renal calculi identified. There is no evidence of contour deforming renal


mass lesion.





Abdominal vasculature: The abdominal aorta is normal in course and caliber


noting moderate atherosclerotic calcification.





Bowel: There is no bowel obstruction. There is moderate sigmoid diverticulosis


without CT evidence of acute diverticulitis. Submucosal fat deposition is


incidentally noted in the cecum. The appendix is  well-visualized and normal.





Peritoneum: There is no intraperitoneal free air or abdominal ascites.





Lymphadenopathy: None.





Pelvic viscera: The bladder is normal as visualized. The uterus is surgically


absent. No adnexal lesion is seen. There is nonspecific induration of the


perianal soft tissues.





Skeletal structures: The skeletal structures are osteopenic. No lytic or blastic


lesions are seen.








IMPRESSION:





1. Suboptimal examination without oral and IV contrast.





2. There are no acute infectious or inflammatory findings in the abdomen or


pelvis.





3. There is nonspecific induration of the perianal soft tissues. This is of


indeterminant etiology and significance. Correlation with physical examination


findings is recommended.





4. Moderate sigmoid diverticulosis without CT evidence of acute diverticulitis.











Electronically signed by:  Erasto Rausch M.D.


6/5/2017 3:01 PM





Dictated Date/Time:  6/5/2017 2:55 PM





 The status of this report is Signed.   


 Draft = Not yet reviewed or approved by Radiologist.  


 Signed = Reviewed and approved by Radiologist.


<AttendingPhy></AttendingPhy> <FamilyPhy>García Thomas D.O.</FamilyPhy> <

PrimaryPhy>García Thomas D.O.</PrimaryPhy> <UnitNumber>[id number]</

UnitNumber> <VisitNumber>[id number]</VisitNumber> <PatientName>SABINO MARTINS</PatientName> <DateOfBirth>1935</DateOfBirth> <Location>[facility]</Location

> <ServiceDate>06/05/17</ServiceDate> <MNE>ESINDI</MNE> <OrderingPhy>Ja 

Addis C PA</OrderingPhy> <OrderingPhyMNE>f rep ord dr camargo</OrderingPhyMNE> <

DictatingPhyMNE>f rep dict dr camargo</DictatingPhyMNE> <CCListMNE>f rep ct mne</

CCListMNE> <AdmittingPhyMNE>f pt admit dr camargo</AdmittingPhyMNE> <AttendingPhyMNE

>f pt attend dr camargo</AttendingPhyMNE>


<ConsultingPhyMNE>f pt consult dr camargo</ConsultingPhyMNE> <FamilyPhyMNE>f pt fam 

dr camargo</FamilyPhyMNE> <OtherPhyMNE>f pt other dr camargo</OtherPhyMNE> <

PrimaryPhyMNE>f pt prim care dr camargo</PrimaryPhyMNE> <ReferringPhyMNE>f pt 

referring dr camargo</ReferringPhyMNE>





Impression


Assessment and Plan


82 y/o female with a history of PMR, RS3PE syndrome, inflammatory symmetrical 

polyarthritis, hypertension, hypothyroidism, irritable bowel syndrome, and 

chronic reflux esophagitis who presented to the ED on 6/5 with nausea, vomiting

, diarrhea, and weakness.  Patient was recently started on hydroxychloroquine 

when the symptoms began and symptoms improved when she stopped taking it.  

Abdominal CT unremarkable and does not show evidence of acute inflammation or 

infection.  White blood cell count elevated at 12.92.  Patient presented to ER 

on 6/3 where she had a urinalysis and urine culture obtained.  Urine culture is 

back positive with Escherichia coli and a second gram-negative margie.  Patient 

received a dose of Rocephin IV in ED.  





Nausea, vomiting, diarrhea--likely secondary to hydroxychloroquine


-Admit to med/surg


-Hold hydroxychloroquine


-Clear liquid diet


-Zofran 4 mg IV every 6 hours when necessary for nausea


-IV fluids with normal saline solution 125 mL per hour





Weakness--could be secondary to N/V/D vs adrenal insufficiency


-Increase patient's prednisone to 15 mg for stress dosing


-PT/OT evaluate and treat





UTI POA--positive urinalysis and urine culture from 6/3, did not receive 

empiric treatment.  Given Rocephin 1 gm IV x 1 in ED 6/5


-Continue Rocephin 1 gm IV qd for now





Hypokalemia


-Potassium 3.3 on arrival


-Potassium chloride 40 mEq PO x 1


-Continue to monitor





Hyponatremia


-Sodium 130 on arrival


-IVF with NSS as above





PMR--per Dr. Coppola's records, this has been stable


-Prednisone as above





RS3PE/inflammatory symmetrical polyarthritis--ongoing


-Hold hydroxychloroquine





HTN--stable


-Continue lisinopril 20 mg PO BID, hydrochlorothiazide 25 mg PO qd, and 

verapamil 120 mg PO qd





Hypothyroidism


-Continue Synthroid 75 g PO qd





Depression


-Continue Zoloft 25 mg PO qhs





DVT prophylaxis


-Heparin 5000 units SC q12h


-PEARL hose and Alistair





Code Status


-Level V, DO NOT RESUSCITATE





Level of Care


Med/Surg





Advanced Directives


Existing Living Will:  Yes


Existing Power of :  Yes





Resuscitation Status


DO NOT RESUSCITATE





VTE Prophylaxis


VTE Risk Assessment Done? Y/N:  Yes


Risk Level:  Moderate


Given or contraindicated:  Unfractionated heparin SQ, T.E.D. Stockings, SCD's





Assessment and Plan


Attending Addendum:











I have physically seen and examined this patient, directed their medical care, 

supervised the Physician Assistant's activity, and agree with the H&P as noted 

above, with the following changes: NONE.

## 2017-06-05 NOTE — EMERGENCY ROOM VISIT NOTE
ED Visit Note


First contact with patient:  13:37


Staff note:


I have reviewed the Patients chart and have discussed this case with my PA. I 

generally agree with the ED note and findings.

## 2017-06-06 VITALS — OXYGEN SATURATION: 95 %

## 2017-06-06 VITALS
TEMPERATURE: 98.24 F | OXYGEN SATURATION: 96 % | HEART RATE: 71 BPM | DIASTOLIC BLOOD PRESSURE: 70 MMHG | SYSTOLIC BLOOD PRESSURE: 148 MMHG

## 2017-06-06 VITALS
OXYGEN SATURATION: 95 % | HEART RATE: 76 BPM | TEMPERATURE: 98.06 F | DIASTOLIC BLOOD PRESSURE: 66 MMHG | SYSTOLIC BLOOD PRESSURE: 130 MMHG

## 2017-06-06 VITALS
TEMPERATURE: 98.42 F | DIASTOLIC BLOOD PRESSURE: 77 MMHG | HEART RATE: 75 BPM | SYSTOLIC BLOOD PRESSURE: 153 MMHG | OXYGEN SATURATION: 96 %

## 2017-06-06 VITALS
SYSTOLIC BLOOD PRESSURE: 171 MMHG | OXYGEN SATURATION: 96 % | DIASTOLIC BLOOD PRESSURE: 64 MMHG | TEMPERATURE: 98.42 F | HEART RATE: 83 BPM

## 2017-06-06 LAB
ANION GAP SERPL CALC-SCNC: 11 MMOL/L (ref 3–11)
BASOPHILS # BLD: 0.04 K/UL (ref 0–0.2)
BASOPHILS NFR BLD: 0.5 %
BUN SERPL-MCNC: 9 MG/DL (ref 7–18)
BUN/CREAT SERPL: 10.1 (ref 10–20)
CALCIUM SERPL-MCNC: 8.8 MG/DL (ref 8.5–10.1)
CHLORIDE SERPL-SCNC: 99 MMOL/L (ref 98–107)
CO2 SERPL-SCNC: 24 MMOL/L (ref 21–32)
COMPLETE: YES
CREAT CL PREDICTED SERPL C-G-VRATE: 45.6 ML/MIN
CREAT SERPL-MCNC: 0.92 MG/DL (ref 0.6–1.2)
EOSINOPHIL NFR BLD AUTO: 309 K/UL (ref 130–400)
GLUCOSE SERPL-MCNC: 80 MG/DL (ref 70–99)
HCT VFR BLD CALC: 25.7 % (ref 37–47)
IG%: 0.3 %
IMM GRANULOCYTES NFR BLD AUTO: 21.7 %
LYMPHOCYTES # BLD: 1.59 K/UL (ref 1.2–3.4)
MCH RBC QN AUTO: 26.2 PG (ref 25–34)
MCHC RBC AUTO-ENTMCNC: 31.9 G/DL (ref 32–36)
MCV RBC AUTO: 82.1 FL (ref 80–100)
MONOCYTES NFR BLD: 7.5 %
NEUTROPHILS # BLD AUTO: 0.7 %
NEUTROPHILS NFR BLD AUTO: 69.3 %
PMV BLD AUTO: 9.8 FL (ref 7.4–10.4)
POTASSIUM SERPL-SCNC: 3.6 MMOL/L (ref 3.5–5.1)
RBC # BLD AUTO: 3.13 M/UL (ref 4.2–5.4)
SODIUM SERPL-SCNC: 134 MMOL/L (ref 136–145)
WBC # BLD AUTO: 7.32 K/UL (ref 4.8–10.8)

## 2017-06-06 RX ADMIN — SERTRALINE HYDROCHLORIDE SCH MG: 50 TABLET, FILM COATED ORAL at 20:36

## 2017-06-06 RX ADMIN — SODIUM CHLORIDE SCH MLS/HR: 900 INJECTION, SOLUTION INTRAVENOUS at 02:12

## 2017-06-06 RX ADMIN — LEVOTHYROXINE SODIUM SCH MCG: 75 TABLET ORAL at 04:55

## 2017-06-06 RX ADMIN — LISINOPRIL SCH MG: 20 TABLET ORAL at 08:50

## 2017-06-06 RX ADMIN — HYDROCHLOROTHIAZIDE SCH MG: 25 TABLET ORAL at 08:52

## 2017-06-06 RX ADMIN — HEPARIN SODIUM SCH UNIT: 10000 INJECTION, SOLUTION INTRAVENOUS; SUBCUTANEOUS at 08:55

## 2017-06-06 RX ADMIN — VERAPAMIL HYDROCHLORIDE SCH MG: 120 TABLET, FILM COATED, EXTENDED RELEASE ORAL at 08:51

## 2017-06-06 RX ADMIN — LISINOPRIL SCH MG: 20 TABLET ORAL at 20:35

## 2017-06-06 RX ADMIN — PANTOPRAZOLE SCH MG: 40 TABLET, DELAYED RELEASE ORAL at 08:52

## 2017-06-06 RX ADMIN — SODIUM CHLORIDE SCH MLS/HR: 900 INJECTION, SOLUTION INTRAVENOUS at 23:17

## 2017-06-06 RX ADMIN — HEPARIN SODIUM SCH UNIT: 10000 INJECTION, SOLUTION INTRAVENOUS; SUBCUTANEOUS at 20:39

## 2017-06-06 RX ADMIN — Medication SCH INTER.UNIT: at 09:01

## 2017-06-06 RX ADMIN — ACETAMINOPHEN PRN MG: 325 TABLET ORAL at 10:31

## 2017-06-06 RX ADMIN — SODIUM CHLORIDE SCH MLS/HR: 900 INJECTION, SOLUTION INTRAVENOUS at 10:33

## 2017-06-06 NOTE — PHARMACY PROGRESS NOTE
ED Pharmacist Culture FollowUp


Date of Service:


Jun 6, 2017.





Patient was admitted to [facility] on 6/5.  They are currently receiving ceftriaxone 

which should cover the E. coli growing from the patient's urine culture.

## 2017-06-06 NOTE — PROGRESS NOTE
Subjective


Date of Service:


Jun 6, 2017.


Subjective


Pt evaluation today including:  conversation w/ patient, physical exam, chart 

review, lab review, review of studies, conversation w/ consultant, review of 

inpatient medication list


No more nausea vomiting, tolerate diet, however reports rahul  wrists and elbows 

pain and swelling, which she has it before,





No other complaint





Problem List


Medical Problems:


(1) Anemia


Status: Chronic  





(2) Diverticulosis Colon (W/O Ment Of Hemorrhage)


Status: Chronic  





(3) DJD (degenerative joint disease)


Status: Chronic  





(4) Esophageal Reflux


Status: Chronic  





(5) Hypertension


Status: Chronic  





(6) Hypothyroidism, Unspecified


Status: Chronic  





(7) Irritable Bowel Syndrome


Status: Chronic  





(8) Long-Term(Current)Use Of Steroids


Status: Chronic  





(9) Polymyalgia Rheumatica


Status: Chronic  





(10) Rheumatoid Arthritis, Unspecified


Status: Chronic  





(11) Unspecified Urinary Incontinence


Status: Chronic  





(12) Urinary tract infection


Status: Acute  





(13) Vitamin D Deficiency, Unspecified


Status: Chronic  





(14) Vomiting and diarrhea


Status: Acute  








Review of Systems


Constitutional:  + weakness, + fatigue, No fever, No chills, No sweats, No 

weight loss, No problem reported


Eyes:  No worsening of vision, No eye pain, No redness, No discharge, No 

diplopia


ENT:  No hearing loss, No unusual epistaxis, No nasal symptoms, No sore throat, 

No tinnitus, No dental problems, No trouble swallowing


Respiratory:  No cough, No sputum, No wheezing, No shortness of breath, No 

dyspnea on exertion, No dyspnea at rest, No hemoptysis


Cardiac:  No chest pain, No orthopnea, No PND, No edema, No claudication, No 

palpitations


Abdomen:  No pain, No nausea, No vomiting, No diarrhea, No constipation


Musculoskeletal:  + joint pain, + swelling, No muscle pain, No calf pain


Female :  No dysuria, No urinary frequency, No hematuria, No incontinence, No 

abnormal vaginal bleeding, No vaginal discharge


Neurologic:  No memory loss, No paralysis, No weakness, No numbness/tingling, 

No vertigo, No balance problems


Psychiatric:  No depression symptoms, No anhedonism, No anxiety, No insomnia, 

No substance abuse


Heme:  No abnormal bleeding/bruising, No clotting problems, No swollen lymph 

nodes, No night sweats


Endo:  No fatigue, No excessive thirst, No excessive urination


Skin:  No rash, No itch, No new/changing skin lesions, No color change, No 

bleeding





Objective


Vital Signs











  Date Time  Temp Pulse Resp B/P (MAP) Pulse Ox O2 Delivery O2 Flow Rate FiO2


 


6/6/17 07:37      Room Air  


 


6/6/17 07:02 36.9 83 18 171/64 (99) 96 Room Air  


 


6/6/17 00:00 36.9 75 18 153/77 (102) 96 Room Air  


 


6/6/17 00:00      Room Air  


 


6/5/17 19:26 36.7 67 18 156/82 (106) 95 Room Air  


 


6/5/17 18:40 36.5 73 16 162/76 98   


 


6/5/17 18:12  73 16 162/76 98 Room Air  


 


6/5/17 17:01     98 Room Air  


 


6/5/17 16:25  70 16 149/87 98 Room Air  


 


6/5/17 15:25  65      


 


6/5/17 15:15  66 16 150/91    


 


6/5/17 13:17 36.5 74 18 135/65 98 Room Air  











Physical Exam


General Appearance:  WD/WN, no apparent distress, + obese


Eyes:  normal inspection, PERRL, EOMI, sclerae normal


ENT:  normal ENT inspection, hearing grossly normal, pharynx normal


Neck:  supple, no adenopathy, thyroid normal, no JVD, no carotid bruits, 

trachea midline


Respiratory/Chest:  chest non-tender, lungs clear, normal breath sounds, no 

respiratory distress, no accessory muscle use


Cardiovascular:  regular rate, rhythm, no edema, no gallop, no JVD, no murmur


Abdomen:  normal bowel sounds, non tender, soft, no organomegaly, no pulsatile 

mass


Extremities:  no pedal edema, no calf tenderness, normal capillary refill, 

pelvis stable, + pertinent finding (bilateral wrists swelling and tender in 

palpation, associated with symmetric bone deformity which is not new)


Neurologic/Psychiatric:  CNs II-XII nml as tested, no motor/sensory deficits, 

alert, normal mood/affect, oriented x 3


Skin:  normal color, warm/dry, no rash


Lymphatic:  no adenopathy





Laboratory Results





Last 24 Hours








Test


  6/5/17


14:20 6/6/17


07:47


 


White Blood Count 12.92 K/uL  7.32 K/uL 


 


Red Blood Count 3.60 M/uL  3.13 M/uL 


 


Hemoglobin 9.5 g/dL  8.2 g/dL 


 


Hematocrit 29.3 %  25.7 % 


 


Mean Corpuscular Volume 81.4 fL  82.1 fL 


 


Mean Corpuscular Hemoglobin 26.4 pg  26.2 pg 


 


Mean Corpuscular Hemoglobin


Concent 32.4 g/dl 


  31.9 g/dl 


 


 


Platelet Count 344 K/uL  309 K/uL 


 


Mean Platelet Volume 10.1 fL  9.8 fL 


 


Neutrophils (%) (Auto) 90.8 %  69.3 % 


 


Lymphocytes (%) (Auto) 3.9 %  21.7 % 


 


Monocytes (%) (Auto) 4.9 %  7.5 % 


 


Eosinophils (%) (Auto) 0.0 %  0.7 % 


 


Basophils (%) (Auto) 0.2 %  0.5 % 


 


Neutrophils # (Auto) 11.73 K/uL  5.07 K/uL 


 


Lymphocytes # (Auto) 0.50 K/uL  1.59 K/uL 


 


Monocytes # (Auto) 0.63 K/uL  0.55 K/uL 


 


Eosinophils # (Auto) 0.00 K/uL  0.05 K/uL 


 


Basophils # (Auto) 0.03 K/uL  0.04 K/uL 


 


RDW Standard Deviation 43.5 fL  44.3 fL 


 


RDW Coefficient of Variation 14.6 %  14.7 % 


 


Immature Granulocyte % (Auto) 0.2 %  0.3 % 


 


Immature Granulocyte # (Auto) 0.03 K/uL  0.02 K/uL 


 


Prothrombin Time 10.9 SECONDS  


 


Prothromb Time International


Ratio 1.0 


  


 


 


Activated Partial


Thromboplast Time 29.1 SECONDS 


  


 


 


Partial Thromboplastin Ratio 1.1  


 


Sodium Level 130 mmol/L  134 mmol/L 


 


Potassium Level 3.3 mmol/L  3.6 mmol/L 


 


Chloride Level 91 mmol/L  99 mmol/L 


 


Carbon Dioxide Level 28 mmol/L  24 mmol/L 


 


Anion Gap 11.0 mmol/L  11.0 mmol/L 


 


Blood Urea Nitrogen 14 mg/dl  9 mg/dl 


 


Creatinine 1.20 mg/dl  0.92 mg/dl 


 


Est Creatinine Clear Calc


Drug Dose 34.9 ml/min 


  45.6 ml/min 


 


 


Estimated GFR (


American) 49.4 


  68.2 


 


 


Estimated GFR (Non-


American 42.7 


  58.8 


 


 


BUN/Creatinine Ratio 12.1  10.1 


 


Random Glucose 112 mg/dl  80 mg/dl 


 


Calcium Level 9.1 mg/dl  


 


Magnesium Level 1.8 mg/dl  


 


Total Bilirubin 0.4 mg/dl  


 


Aspartate Amino Transf


(AST/SGOT) 13 U/L 


  


 


 


Alanine Aminotransferase


(ALT/SGPT) 13 U/L 


  


 


 


Alkaline Phosphatase 54 U/L  


 


Total Protein 6.7 gm/dl  


 


Albumin 3.3 gm/dl  


 


Globulin 3.4 gm/dl  


 


Albumin/Globulin Ratio 1.0  


 


Lipase 105 U/L  











Assessment and Plan


80 y/o female admitted on 6/5/2017 with possible gastritis with nausea, vomiting

, diarrhea, and weakness. 





Patient was recently started on hydroxychloroquine when the symptoms began and 

symptoms improved when she stopped taking it.  Abdominal CT unremarkable and 

does not show evidence of acute inflammation or infection.  White blood cell 

count elevated at 12.92.  Patient presented to ER on 6/3 where she had a 

urinalysis and urine culture obtained.  Urine culture is back positive with 

Escherichia coli and a second gram-negative margie.  Patient received a dose of 

Rocephin IV in ED.  





History of PMR, RS3PE syndrome, inflammatory symmetrical polyarthritis, 

hypertension, hypothyroidism, irritable bowel syndrome, and chronic reflux 

esophagitis who presented to the ED on





Possible drug-induced gastritis by hydroxychloroquine with  Nausea, vomiting, 

diarrhea--likely 


Stable and improved


- cont hold hydroxychloroquine


-Continue Clear liquid diet


-Continue Zofran 4 mg IV every 6 hours when necessary for nausea


- Decreased IV fluids with normal saline solution 125 mL per hour to a female 

per hour





Weakness--could be secondary to N/V/D vs adrenal insufficiency


-Has Increases patient's prednisone to 15 mg for stress dosing upon admission


-PT/OT evaluate and treat





UTI POA--positive urinalysis and urine culture from 6/3, did not receive 

empiric treatment.  Given Rocephin 1 gm IV x 1 in ED 6/5


-Continue Rocephin 1 gm IV qd for now, will change to oral per culture results 

and if tolerate diet continuely





Hypokalemia resolved


-Potassium 3.3 on arrival


-Potassium chloride 40 mEq PO x 1 upon admission improved


-Continue to monitor





Hyponatremia improved


-Sodium 130 on arrival


-IVF with NSS as above





PMR--per Dr. Coppola's records, this has been stable


-Prednisone as above


- I'm contacting Dr. garcia office to get advice of hydroxychloroquine, will 

night to know to decrease the dose of one tablet a day on restart the same dose 

1 tablet 2 times a day, or change to other medication, we'll continue follow-

up.  Patient reported she tolerated well when hydroxychloroquine was on once a 

day





RS3PE/inflammatory symmetrical polyarthritis--ongoing


-Hold hydroxychloroquine





HTN--stable


-Continue lisinopril 20 mg PO BID, hydrochlorothiazide 25 mg PO qd, and 

verapamil 120 mg PO qd





Hypothyroidism


-Continue Synthroid 75 g PO qd





Depression


-Continue Zoloft 25 mg PO qhs





DVT prophylaxis


-Heparin 5000 units SC q12h


-PEARL hose and SCDs





Code Status


-Level V, DO NOT RESUSCITATE





Discussed with patient about a care plan, answered all the questions,


Continued [facility] stay due to:  multiple IV medications needed


Discharge planning:  home

## 2017-06-07 VITALS
DIASTOLIC BLOOD PRESSURE: 74 MMHG | TEMPERATURE: 97.88 F | OXYGEN SATURATION: 95 % | SYSTOLIC BLOOD PRESSURE: 153 MMHG | HEART RATE: 73 BPM

## 2017-06-07 VITALS
DIASTOLIC BLOOD PRESSURE: 69 MMHG | TEMPERATURE: 98.06 F | OXYGEN SATURATION: 97 % | HEART RATE: 76 BPM | SYSTOLIC BLOOD PRESSURE: 181 MMHG

## 2017-06-07 VITALS — OXYGEN SATURATION: 95 %

## 2017-06-07 VITALS
HEART RATE: 72 BPM | DIASTOLIC BLOOD PRESSURE: 68 MMHG | OXYGEN SATURATION: 97 % | SYSTOLIC BLOOD PRESSURE: 152 MMHG | TEMPERATURE: 97.7 F

## 2017-06-07 VITALS — OXYGEN SATURATION: 97 %

## 2017-06-07 LAB
ANION GAP SERPL CALC-SCNC: 11 MMOL/L (ref 3–11)
BASOPHILS # BLD: 0.03 K/UL (ref 0–0.2)
BASOPHILS NFR BLD: 0.5 %
BUN SERPL-MCNC: 8 MG/DL (ref 7–18)
BUN/CREAT SERPL: 8.9 (ref 10–20)
CALCIUM SERPL-MCNC: 8.8 MG/DL (ref 8.5–10.1)
CHLORIDE SERPL-SCNC: 100 MMOL/L (ref 98–107)
CO2 SERPL-SCNC: 24 MMOL/L (ref 21–32)
COMPLETE: YES
CREAT CL PREDICTED SERPL C-G-VRATE: 48.2 ML/MIN
CREAT SERPL-MCNC: 0.87 MG/DL (ref 0.6–1.2)
EOSINOPHIL NFR BLD AUTO: 311 K/UL (ref 130–400)
GLUCOSE SERPL-MCNC: 83 MG/DL (ref 70–99)
HCT VFR BLD CALC: 26.6 % (ref 37–47)
IG%: 0.3 %
IMM GRANULOCYTES NFR BLD AUTO: 22.3 %
LYMPHOCYTES # BLD: 1.36 K/UL (ref 1.2–3.4)
MCH RBC QN AUTO: 25.3 PG (ref 25–34)
MCHC RBC AUTO-ENTMCNC: 31.2 G/DL (ref 32–36)
MCV RBC AUTO: 81.1 FL (ref 80–100)
MONOCYTES NFR BLD: 10.2 %
NEUTROPHILS # BLD AUTO: 0.2 %
NEUTROPHILS NFR BLD AUTO: 66.5 %
PMV BLD AUTO: 9.5 FL (ref 7.4–10.4)
POTASSIUM SERPL-SCNC: 3.5 MMOL/L (ref 3.5–5.1)
RBC # BLD AUTO: 3.28 M/UL (ref 4.2–5.4)
SODIUM SERPL-SCNC: 135 MMOL/L (ref 136–145)
WBC # BLD AUTO: 6.09 K/UL (ref 4.8–10.8)

## 2017-06-07 RX ADMIN — HYDROCHLOROTHIAZIDE SCH MG: 25 TABLET ORAL at 09:07

## 2017-06-07 RX ADMIN — Medication SCH INTER.UNIT: at 09:07

## 2017-06-07 RX ADMIN — CEPHALEXIN SCH MG: 500 CAPSULE ORAL at 21:17

## 2017-06-07 RX ADMIN — HEPARIN SODIUM SCH UNIT: 10000 INJECTION, SOLUTION INTRAVENOUS; SUBCUTANEOUS at 09:13

## 2017-06-07 RX ADMIN — ACETAMINOPHEN PRN MG: 325 TABLET ORAL at 11:09

## 2017-06-07 RX ADMIN — VERAPAMIL HYDROCHLORIDE SCH MG: 120 TABLET, FILM COATED, EXTENDED RELEASE ORAL at 09:07

## 2017-06-07 RX ADMIN — HEPARIN SODIUM SCH UNIT: 10000 INJECTION, SOLUTION INTRAVENOUS; SUBCUTANEOUS at 21:18

## 2017-06-07 RX ADMIN — METRONIDAZOLE SCH MG: 500 TABLET ORAL at 21:17

## 2017-06-07 RX ADMIN — LISINOPRIL SCH MG: 20 TABLET ORAL at 21:17

## 2017-06-07 RX ADMIN — SERTRALINE HYDROCHLORIDE SCH MG: 50 TABLET, FILM COATED ORAL at 21:17

## 2017-06-07 RX ADMIN — PANTOPRAZOLE SCH MG: 40 TABLET, DELAYED RELEASE ORAL at 09:07

## 2017-06-07 RX ADMIN — LISINOPRIL SCH MG: 20 TABLET ORAL at 09:07

## 2017-06-07 RX ADMIN — CEPHALEXIN SCH MG: 500 CAPSULE ORAL at 16:50

## 2017-06-07 RX ADMIN — LEVOTHYROXINE SODIUM SCH MCG: 75 TABLET ORAL at 06:04

## 2017-06-07 RX ADMIN — CEPHALEXIN SCH MG: 500 CAPSULE ORAL at 13:32

## 2017-06-07 NOTE — PROGRESS NOTE
Subjective


Date of Service:


Jun 7, 2017.


Subjective


Pt evaluation today including:  conversation w/ patient, physical exam, chart 

review, lab review, review of studies, conversation w/ consultant, review of 

inpatient medication list


Report 2-3 times diarrhea since yesterday, stool samples mixed with incontinent 

urine,


Generally looking better, and rahul  hands and wrist less swelling and pain


No other complaint





Problem List


Medical Problems:


(1) Anemia


Status: Chronic  





(2) Diverticulosis Colon (W/O Ment Of Hemorrhage)


Status: Chronic  





(3) DJD (degenerative joint disease)


Status: Chronic  





(4) Esophageal Reflux


Status: Chronic  





(5) Hypertension


Status: Chronic  





(6) Hypothyroidism, Unspecified


Status: Chronic  





(7) Irritable Bowel Syndrome


Status: Chronic  





(8) Long-Term(Current)Use Of Steroids


Status: Chronic  





(9) Polymyalgia Rheumatica


Status: Chronic  





(10) Rheumatoid Arthritis, Unspecified


Status: Chronic  





(11) Unspecified Urinary Incontinence


Status: Chronic  





(12) Urinary tract infection


Status: Acute  





(13) Vitamin D Deficiency, Unspecified


Status: Chronic  





(14) Vomiting and diarrhea


Status: Acute  








Review of Systems


Constitutional:  + weakness, + fatigue, No fever, No chills, No sweats, No 

weight loss, No problem reported


Eyes:  No worsening of vision, No eye pain, No redness, No discharge, No 

diplopia


ENT:  No hearing loss, No unusual epistaxis, No nasal symptoms, No sore throat, 

No tinnitus, No dental problems, No trouble swallowing


Respiratory:  No cough, No sputum, No wheezing, No shortness of breath, No 

dyspnea on exertion, No dyspnea at rest, No hemoptysis


Cardiac:  No chest pain, No orthopnea, No PND, No edema, No claudication, No 

palpitations


Abdomen:  + diarrhea, No pain, No nausea, No vomiting, No constipation


Musculoskeletal:  No joint pain, No muscle pain, No swelling, No calf pain


Female :  No dysuria, No urinary frequency, No hematuria, No incontinence, No 

abnormal vaginal bleeding, No vaginal discharge


Neurologic:  No memory loss, No paralysis, No weakness, No numbness/tingling, 

No vertigo, No balance problems


Psychiatric:  No depression symptoms, No anhedonism, No anxiety, No insomnia, 

No substance abuse


Heme:  No abnormal bleeding/bruising, No clotting problems, No swollen lymph 

nodes, No night sweats


Endo:  No fatigue, No excessive thirst, No excessive urination


Skin:  No rash, No itch, No new/changing skin lesions, No color change, No 

bleeding





Objective


Vital Signs











  Date Time  Temp Pulse Resp B/P (MAP) Pulse Ox O2 Delivery O2 Flow Rate FiO2


 


6/7/17 10:00 36.5 72 18 152/68 (96) 97 Room Air  


 


6/7/17 07:45     97 Room Air  


 


6/7/17 07:25 36.7 76 18 181/69 (106) 97 Room Air  


 


6/6/17 23:15      Room Air  


 


6/6/17 23:13 36.8 71 18 148/70 (96) 96 Room Air  


 


6/6/17 16:00     95 Room Air  


 


6/6/17 15:38 36.7 76 18 130/66 (87) 95 Room Air  











Physical Exam


General Appearance:  WD/WN, no apparent distress


Eyes:  normal inspection, PERRL, EOMI, sclerae normal


ENT:  normal ENT inspection, hearing grossly normal, pharynx normal


Neck:  supple, no adenopathy, thyroid normal, no JVD, no carotid bruits, 

trachea midline


Respiratory/Chest:  chest non-tender, lungs clear, normal breath sounds, no 

respiratory distress, no accessory muscle use


Cardiovascular:  regular rate, rhythm, no edema, no gallop, no JVD, no murmur


Abdomen:  normal bowel sounds, non tender, soft, no organomegaly, no pulsatile 

mass


Extremities:  normal range of motion, non-tender, normal inspection, no pedal 

edema, no calf tenderness, normal capillary refill, pelvis stable


Neurologic/Psychiatric:  CNs II-XII nml as tested, no motor/sensory deficits, 

alert, normal mood/affect, oriented x 3


Skin:  normal color, warm/dry, no rash


Lymphatic:  no adenopathy





Laboratory Results





Last 24 Hours








Test


  6/7/17


07:58


 


White Blood Count 6.09 K/uL 


 


Red Blood Count 3.28 M/uL 


 


Hemoglobin 8.3 g/dL 


 


Hematocrit 26.6 % 


 


Mean Corpuscular Volume 81.1 fL 


 


Mean Corpuscular Hemoglobin 25.3 pg 


 


Mean Corpuscular Hemoglobin


Concent 31.2 g/dl 


 


 


Platelet Count 311 K/uL 


 


Mean Platelet Volume 9.5 fL 


 


Neutrophils (%) (Auto) 66.5 % 


 


Lymphocytes (%) (Auto) 22.3 % 


 


Monocytes (%) (Auto) 10.2 % 


 


Eosinophils (%) (Auto) 0.2 % 


 


Basophils (%) (Auto) 0.5 % 


 


Neutrophils # (Auto) 4.05 K/uL 


 


Lymphocytes # (Auto) 1.36 K/uL 


 


Monocytes # (Auto) 0.62 K/uL 


 


Eosinophils # (Auto) 0.01 K/uL 


 


Basophils # (Auto) 0.03 K/uL 


 


RDW Standard Deviation 42.6 fL 


 


RDW Coefficient of Variation 14.3 % 


 


Immature Granulocyte % (Auto) 0.3 % 


 


Immature Granulocyte # (Auto) 0.02 K/uL 


 


Red Blood Cell Morphology Unremarkable 


 


Sodium Level 135 mmol/L 


 


Potassium Level 3.5 mmol/L 


 


Chloride Level 100 mmol/L 


 


Carbon Dioxide Level 24 mmol/L 


 


Anion Gap 11.0 mmol/L 


 


Blood Urea Nitrogen 8 mg/dl 


 


Creatinine 0.87 mg/dl 


 


Est Creatinine Clear Calc


Drug Dose 48.2 ml/min 


 


 


Estimated GFR (


American) 72.9 


 


 


Estimated GFR (Non-


American 62.9 


 


 


BUN/Creatinine Ratio 8.9 


 


Random Glucose 83 mg/dl 


 


Calcium Level 8.8 mg/dl 











Assessment and Plan


80 y/o female admitted on 6/5/2017 with possible gastritis with nausea, vomiting

, diarrhea, and weakness. 





Patient was recently started on hydroxychloroquine when the symptoms began and 

symptoms improved when she stopped taking it.  Abdominal CT unremarkable and 

does not show evidence of acute inflammation or infection.  White blood cell 

count elevated at 12.92.  Patient presented to ER on 6/3 where she had a 

urinalysis and urine culture obtained.  Urine culture is back positive with 

Escherichia coli and a second gram-negative margie.  Patient received a dose of 

Rocephin IV in ED.  





History of PMR, RS3PE syndrome, inflammatory symmetrical polyarthritis, 

hypertension, hypothyroidism, irritable bowel syndrome, and chronic reflux 

esophagitis who presented to the ED on





Possible drug-induced gastritis by hydroxychloroquine with  Nausea, vomiting, 

diarrhea--likely 


Now has some nausea edition symptoms plus some diarrhea


Continue Stable and improved


- cont hold hydroxychloroquine


-Advance diet as tolerated


-Continue Zofran 4 mg IV every 6 hours when necessary for nausea


- Decreased IV fluids with normal saline solution 125 mL per hour to a female 

per hour


- Some diarrhea with currently on antibiotic, check stool C. difficile, which 

has been sent





UTI POA has been on Rocephin since 6/5, did not receive empiric treatment.  3 

out of 7 days , 


Change to oral Keflex, no culture results.





Hypokalemia resolved


-Potassium 3.3 on arrival


-Potassium chloride 40 mEq PO x 1 upon admission improved


-Continue to monitor





Hyponatremia improved


-Sodium 130 on arrival


-IVF with NSS as above





PMR--per Dr. Coppola's records, this has been stable


RS3PE/inflammatory symmetrical polyarthritis--ongoing


-Hold hydroxychloroquine


- Discussed with Dr. coppola, recommend continue hold hydroxychloroquine, and 

follow-up with her as outpatient 2 week





HTN--stable


-Continue lisinopril 20 mg PO BID, hydrochlorothiazide 25 mg PO qd, and 

verapamil 120 mg PO qd





Hypothyroidism


-Continue Synthroid 75 g PO qd





Depression


-Continue Zoloft 25 mg PO qhs





DVT prophylaxis


-Heparin 5000 units SC q12h


-PEARL hose and SCDs





Code Status


-Level V, DO NOT RESUSCITATE


- PT OT recommend rehabilitation, will talk to  for the discharge 

plan


- Possible tomorrow





Discussed with patient about a care plan, answered all the questions,


Continued [facility] stay due to:  multiple IV medications needed


Discharge planning:  home

## 2017-06-08 VITALS
HEART RATE: 79 BPM | OXYGEN SATURATION: 94 % | TEMPERATURE: 98.24 F | SYSTOLIC BLOOD PRESSURE: 173 MMHG | DIASTOLIC BLOOD PRESSURE: 73 MMHG

## 2017-06-08 VITALS
OXYGEN SATURATION: 95 % | HEART RATE: 75 BPM | SYSTOLIC BLOOD PRESSURE: 153 MMHG | DIASTOLIC BLOOD PRESSURE: 83 MMHG | TEMPERATURE: 97.88 F

## 2017-06-08 VITALS
SYSTOLIC BLOOD PRESSURE: 113 MMHG | HEART RATE: 70 BPM | OXYGEN SATURATION: 96 % | DIASTOLIC BLOOD PRESSURE: 61 MMHG | TEMPERATURE: 98.06 F

## 2017-06-08 VITALS — OXYGEN SATURATION: 96 %

## 2017-06-08 LAB
ANION GAP SERPL CALC-SCNC: 10 MMOL/L (ref 3–11)
BASOPHILS # BLD: 0.03 K/UL (ref 0–0.2)
BASOPHILS NFR BLD: 0.3 %
BUN SERPL-MCNC: 8 MG/DL (ref 7–18)
BUN/CREAT SERPL: 8.6 (ref 10–20)
CALCIUM SERPL-MCNC: 9.2 MG/DL (ref 8.5–10.1)
CHLORIDE SERPL-SCNC: 98 MMOL/L (ref 98–107)
CO2 SERPL-SCNC: 27 MMOL/L (ref 21–32)
COMPLETE: YES
CREAT CL PREDICTED SERPL C-G-VRATE: 43.2 ML/MIN
CREAT SERPL-MCNC: 0.97 MG/DL (ref 0.6–1.2)
EOSINOPHIL NFR BLD AUTO: 309 K/UL (ref 130–400)
GLUCOSE SERPL-MCNC: 88 MG/DL (ref 70–99)
HCT VFR BLD CALC: 27.1 % (ref 37–47)
IG%: 0.3 %
IMM GRANULOCYTES NFR BLD AUTO: 12.1 %
LYMPHOCYTES # BLD: 1.23 K/UL (ref 1.2–3.4)
MAGNESIUM SERPL-MCNC: 1.5 MG/DL (ref 1.8–2.4)
MCH RBC QN AUTO: 26.4 PG (ref 25–34)
MCHC RBC AUTO-ENTMCNC: 32.1 G/DL (ref 32–36)
MCV RBC AUTO: 82.1 FL (ref 80–100)
MONOCYTES NFR BLD: 5.4 %
NEUTROPHILS # BLD AUTO: 0.4 %
NEUTROPHILS NFR BLD AUTO: 81.5 %
NEUTS HYPERSEG BLD QL SMEAR: (no result)
PMV BLD AUTO: 9.9 FL (ref 7.4–10.4)
POTASSIUM SERPL-SCNC: 3.1 MMOL/L (ref 3.5–5.1)
RBC # BLD AUTO: 3.3 M/UL (ref 4.2–5.4)
SODIUM SERPL-SCNC: 135 MMOL/L (ref 136–145)
WBC # BLD AUTO: 10.16 K/UL (ref 4.8–10.8)

## 2017-06-08 RX ADMIN — ACETAMINOPHEN PRN MG: 325 TABLET ORAL at 18:13

## 2017-06-08 RX ADMIN — CEFUROXIME AXETIL SCH MG: 500 TABLET ORAL at 13:58

## 2017-06-08 RX ADMIN — METRONIDAZOLE SCH MG: 500 TABLET ORAL at 09:05

## 2017-06-08 RX ADMIN — HYDROCHLOROTHIAZIDE SCH MG: 25 TABLET ORAL at 09:05

## 2017-06-08 RX ADMIN — METRONIDAZOLE SCH MG: 500 TABLET ORAL at 20:59

## 2017-06-08 RX ADMIN — CEPHALEXIN SCH MG: 500 CAPSULE ORAL at 09:13

## 2017-06-08 RX ADMIN — Medication SCH MG: at 21:00

## 2017-06-08 RX ADMIN — SODIUM CHLORIDE AND POTASSIUM CHLORIDE SCH MLS/HR: 9; 1.49 INJECTION, SOLUTION INTRAVENOUS at 20:57

## 2017-06-08 RX ADMIN — HEPARIN SODIUM SCH UNIT: 10000 INJECTION, SOLUTION INTRAVENOUS; SUBCUTANEOUS at 09:06

## 2017-06-08 RX ADMIN — Medication SCH INTER.UNIT: at 09:12

## 2017-06-08 RX ADMIN — CEFUROXIME AXETIL SCH MG: 500 TABLET ORAL at 21:01

## 2017-06-08 RX ADMIN — METRONIDAZOLE SCH MG: 500 TABLET ORAL at 13:58

## 2017-06-08 RX ADMIN — CEPHALEXIN SCH MG: 500 CAPSULE ORAL at 12:54

## 2017-06-08 RX ADMIN — PANTOPRAZOLE SCH MG: 40 TABLET, DELAYED RELEASE ORAL at 09:13

## 2017-06-08 RX ADMIN — LEVOTHYROXINE SODIUM SCH MCG: 75 TABLET ORAL at 06:29

## 2017-06-08 RX ADMIN — LISINOPRIL SCH MG: 20 TABLET ORAL at 09:12

## 2017-06-08 RX ADMIN — LISINOPRIL SCH MG: 20 TABLET ORAL at 20:59

## 2017-06-08 RX ADMIN — Medication SCH MG: at 12:53

## 2017-06-08 RX ADMIN — SODIUM CHLORIDE AND POTASSIUM CHLORIDE SCH MLS/HR: 9; 1.49 INJECTION, SOLUTION INTRAVENOUS at 12:53

## 2017-06-08 RX ADMIN — VERAPAMIL HYDROCHLORIDE SCH MG: 120 TABLET, FILM COATED, EXTENDED RELEASE ORAL at 09:05

## 2017-06-08 RX ADMIN — HEPARIN SODIUM SCH UNIT: 10000 INJECTION, SOLUTION INTRAVENOUS; SUBCUTANEOUS at 21:06

## 2017-06-08 RX ADMIN — SERTRALINE HYDROCHLORIDE SCH MG: 50 TABLET, FILM COATED ORAL at 21:01

## 2017-06-08 NOTE — PROGRESS NOTE
Subjective


Date of Service:


Jun 8, 2017.


Subjective


Pt evaluation today including:  conversation w/ patient, physical exam, chart 

review, lab review, review of studies, conversation w/ consultant, review of 

inpatient medication list


Feel mild nausea, a lot of diarrhea, had diarrhea every time after she eat 


Gen. feel tired





Problem List


Medical Problems:


(1) Anemia


Status: Chronic  





(2) Diverticulosis Colon (W/O Ment Of Hemorrhage)


Status: Chronic  





(3) DJD (degenerative joint disease)


Status: Chronic  





(4) Esophageal Reflux


Status: Chronic  





(5) Hypertension


Status: Chronic  





(6) Hypothyroidism, Unspecified


Status: Chronic  





(7) Irritable Bowel Syndrome


Status: Chronic  





(8) Long-Term(Current)Use Of Steroids


Status: Chronic  





(9) Polymyalgia Rheumatica


Status: Chronic  





(10) Rheumatoid Arthritis, Unspecified


Status: Chronic  





(11) Unspecified Urinary Incontinence


Status: Chronic  





(12) Urinary tract infection


Status: Acute  





(13) Vitamin D Deficiency, Unspecified


Status: Chronic  





(14) Vomiting and diarrhea


Status: Acute  








Review of Systems


Constitutional:  No fever, No chills, No sweats, No weight loss, No weakness, 

No fatigue, No problem reported


Eyes:  No worsening of vision, No eye pain, No redness, No discharge, No 

diplopia


ENT:  No hearing loss, No unusual epistaxis, No nasal symptoms, No sore throat, 

No tinnitus, No dental problems, No trouble swallowing


Respiratory:  No cough, No sputum, No wheezing, No shortness of breath, No 

dyspnea on exertion, No dyspnea at rest, No hemoptysis


Cardiac:  No chest pain, No orthopnea, No PND, No edema, No claudication, No 

palpitations


Abdomen:  + see HPI, + nausea, + diarrhea, No pain, No vomiting, No constipation


Musculoskeletal:  No joint pain, No muscle pain, No swelling, No calf pain


Female :  No dysuria, No urinary frequency, No hematuria, No incontinence, No 

abnormal vaginal bleeding, No vaginal discharge


Neurologic:  No memory loss, No paralysis, No weakness, No numbness/tingling, 

No vertigo, No balance problems


Psychiatric:  No depression symptoms, No anhedonism, No anxiety, No insomnia, 

No substance abuse


Heme:  No abnormal bleeding/bruising, No clotting problems, No swollen lymph 

nodes, No night sweats


Endo:  No fatigue, No excessive thirst, No excessive urination


Skin:  No rash, No itch, No new/changing skin lesions, No color change, No 

bleeding





Objective


Vital Signs











  Date Time  Temp Pulse Resp B/P (MAP) Pulse Ox O2 Delivery O2 Flow Rate FiO2


 


6/8/17 08:00      Room Air  


 


6/8/17 07:34 36.8 79 20 173/73 (106) 94   


 


6/8/17 00:51 36.6 75 16 153/83 (106) 95   


 


6/7/17 23:59      Room Air  


 


6/7/17 16:00     95 Room Air  


 


6/7/17 15:54 36.6 73 18 153/74 (100) 95 Room Air  











Physical Exam


General Appearance:  WD/WN, no apparent distress, + obese, + pertinent finding (

looks tired, ill looking)


Eyes:  normal inspection, PERRL, EOMI, sclerae normal


ENT:  normal ENT inspection, hearing grossly normal, pharynx normal


Neck:  supple, no adenopathy, thyroid normal, no JVD, no carotid bruits, 

trachea midline


Respiratory/Chest:  chest non-tender, normal breath sounds, no respiratory 

distress, no accessory muscle use, + decreased breath sounds


Cardiovascular:  regular rate, rhythm, no edema, no gallop, no JVD, no murmur


Abdomen:  normal bowel sounds, non tender, soft, no organomegaly, no pulsatile 

mass


Extremities:  normal range of motion, non-tender, normal inspection, no pedal 

edema, no calf tenderness, normal capillary refill, pelvis stable


Neurologic/Psychiatric:  CNs II-XII nml as tested, no motor/sensory deficits, 

alert, normal mood/affect, oriented x 3


Skin:  normal color, warm/dry, no rash


Lymphatic:  no adenopathy





Laboratory Results





Last 24 Hours








Test


  6/8/17


07:22


 


White Blood Count 10.16 K/uL 


 


Red Blood Count 3.30 M/uL 


 


Hemoglobin 8.7 g/dL 


 


Hematocrit 27.1 % 


 


Mean Corpuscular Volume 82.1 fL 


 


Mean Corpuscular Hemoglobin 26.4 pg 


 


Mean Corpuscular Hemoglobin


Concent 32.1 g/dl 


 


 


Platelet Count 309 K/uL 


 


Mean Platelet Volume 9.9 fL 


 


Neutrophils (%) (Auto) 81.5 % 


 


Lymphocytes (%) (Auto) 12.1 % 


 


Monocytes (%) (Auto) 5.4 % 


 


Eosinophils (%) (Auto) 0.4 % 


 


Basophils (%) (Auto) 0.3 % 


 


Neutrophils # (Auto) 8.28 K/uL 


 


Lymphocytes # (Auto) 1.23 K/uL 


 


Monocytes # (Auto) 0.55 K/uL 


 


Eosinophils # (Auto) 0.04 K/uL 


 


Basophils # (Auto) 0.03 K/uL 


 


RDW Standard Deviation 43.6 fL 


 


RDW Coefficient of Variation 14.4 % 


 


Immature Granulocyte % (Auto) 0.3 % 


 


Immature Granulocyte # (Auto) 0.03 K/uL 


 


Hypersegmented Polys 1+ 


 


Sodium Level 135 mmol/L 


 


Potassium Level 3.1 mmol/L 


 


Chloride Level 98 mmol/L 


 


Carbon Dioxide Level 27 mmol/L 


 


Anion Gap 10.0 mmol/L 


 


Blood Urea Nitrogen 8 mg/dl 


 


Creatinine 0.97 mg/dl 


 


Est Creatinine Clear Calc


Drug Dose 43.2 ml/min 


 


 


Estimated GFR (


American) 63.9 


 


 


Estimated GFR (Non-


American 55.2 


 


 


BUN/Creatinine Ratio 8.6 


 


Random Glucose 88 mg/dl 


 


Calcium Level 9.2 mg/dl 


 


Magnesium Level 1.5 mg/dl 











Assessment and Plan


80 y/o female admitted on 6/5/2017 with possible gastritis with nausea, vomiting

, diarrhea, and weakness, found has diarrhea and C. difficile positive on 06/07/ 2017








Patient was recently started on hydroxychloroquine when the symptoms began and 

symptoms improved when she stopped taking it.  


Abdominal CT unremarkable and does not show evidence of acute inflammation or 

infection.  White blood cell count elevated at 12.92.  


Patient presented to ER on 6/3 where she had a urinalysis and urine culture 

obtained.  Urine culture is back positive with Escherichia coli and a second 

gram-negative margie.  Patient received a dose of Rocephin IV in ED.  








C. difficile diarrhea, new diagnosed which is her first time of this condition, 

not improved yet


Started the metronidazole by mouth 3 times a day from 06/07/2017, day 1 out of 

14


Hypokinemia, hypomagnesemia, replaced


Diarrhea and poor by mouth intake, possible dehydration, start IV fluid, follow-

up lytes





Gastritis upon admission , resolved


History of PMR, RS3PE syndrome, inflammatory symmetrical polyarthritis, 

hypertension, hypothyroidism, irritable bowel syndrome, and chronic reflux 

esophagitis who presented to the ED 





Possible drug-induced gastritis by hydroxychloroquine with  Nausea, vomiting, 

diarrhea--likely 


Symptom was totally resolved after holding hydroxychloroquine


Continue Stable and improved


- cont hold hydroxychloroquine


-Advance diet as tolerated


-Continue Zofran 4 mg IV every 6 hours when necessary for nausea





Escherichia coli UTI POA has been on Rocephin since 6/5, 


Was changed to Keflex


To be sensitive per culture results


 4 out of 7 days , 





Hypokalemia resolved


-Potassium 3.3 on arrival


-Potassium chloride 40 mEq PO x 1 upon admission improved


-Continue to monitor





Hyponatremia improved


-Sodium 130 on arrival


-IVF with NSS as above





PMR--per Dr. Coppola's records, this has been stable


RS3PE/inflammatory symmetrical polyarthritis--ongoing


-Hold hydroxychloroquine


- Discussed with Dr. coppola, recommend continue hold hydroxychloroquine, and 

follow-up with her as outpatient 2 week





HTN--stable


-Continue lisinopril 20 mg PO BID, hydrochlorothiazide 25 mg PO qd, and 

verapamil 120 mg PO qd





Hypothyroidism


-Continue Synthroid 75 g PO qd





Depression


-Continue Zoloft 25 mg PO qhs





DVT prophylaxis


-Heparin 5000 units SC q12h


-PEARL hose and SCDs





Code Status


-Level V, DO NOT RESUSCITATE


- PT OT recommend rehabilitation, will talk to  for the discharge 

plan


- Possible after improving and diarrhea better





Discussed with patient about a care plan, answered all the questions,


Continued [facility] stay due to:  multiple IV medications needed


Discharge planning:  home

## 2017-06-09 VITALS
DIASTOLIC BLOOD PRESSURE: 70 MMHG | TEMPERATURE: 97.52 F | OXYGEN SATURATION: 97 % | SYSTOLIC BLOOD PRESSURE: 136 MMHG | HEART RATE: 71 BPM

## 2017-06-09 VITALS
OXYGEN SATURATION: 98 % | DIASTOLIC BLOOD PRESSURE: 72 MMHG | SYSTOLIC BLOOD PRESSURE: 134 MMHG | TEMPERATURE: 97.34 F | HEART RATE: 64 BPM

## 2017-06-09 VITALS
SYSTOLIC BLOOD PRESSURE: 155 MMHG | TEMPERATURE: 98.24 F | OXYGEN SATURATION: 97 % | DIASTOLIC BLOOD PRESSURE: 77 MMHG | HEART RATE: 67 BPM

## 2017-06-09 VITALS — HEART RATE: 64 BPM | SYSTOLIC BLOOD PRESSURE: 132 MMHG | DIASTOLIC BLOOD PRESSURE: 68 MMHG

## 2017-06-09 LAB
ANION GAP SERPL CALC-SCNC: 7 MMOL/L (ref 3–11)
BASOPHILS # BLD: 0.04 K/UL (ref 0–0.2)
BASOPHILS NFR BLD: 0.6 %
BUN SERPL-MCNC: 10 MG/DL (ref 7–18)
BUN/CREAT SERPL: 10.2 (ref 10–20)
CALCIUM SERPL-MCNC: 8.5 MG/DL (ref 8.5–10.1)
CHLORIDE SERPL-SCNC: 104 MMOL/L (ref 98–107)
CO2 SERPL-SCNC: 27 MMOL/L (ref 21–32)
COMPLETE: YES
CREAT CL PREDICTED SERPL C-G-VRATE: 43.2 ML/MIN
CREAT SERPL-MCNC: 0.97 MG/DL (ref 0.6–1.2)
EOSINOPHIL NFR BLD AUTO: 330 K/UL (ref 130–400)
GLUCOSE SERPL-MCNC: 84 MG/DL (ref 70–99)
HCT VFR BLD CALC: 26.4 % (ref 37–47)
IG%: 0.5 %
IMM GRANULOCYTES NFR BLD AUTO: 23.1 %
LYMPHOCYTES # BLD: 1.43 K/UL (ref 1.2–3.4)
MAGNESIUM SERPL-MCNC: 1.8 MG/DL (ref 1.8–2.4)
MCH RBC QN AUTO: 26.3 PG (ref 25–34)
MCHC RBC AUTO-ENTMCNC: 31.8 G/DL (ref 32–36)
MCV RBC AUTO: 82.5 FL (ref 80–100)
MONOCYTES NFR BLD: 8.3 %
NEUTROPHILS # BLD AUTO: 0.8 %
NEUTROPHILS NFR BLD AUTO: 66.7 %
NEUTS HYPERSEG BLD QL SMEAR: (no result)
PMV BLD AUTO: 9.5 FL (ref 7.4–10.4)
POTASSIUM SERPL-SCNC: 4.1 MMOL/L (ref 3.5–5.1)
RBC # BLD AUTO: 3.2 M/UL (ref 4.2–5.4)
SODIUM SERPL-SCNC: 138 MMOL/L (ref 136–145)
WBC # BLD AUTO: 6.18 K/UL (ref 4.8–10.8)

## 2017-06-09 RX ADMIN — LISINOPRIL SCH MG: 20 TABLET ORAL at 08:15

## 2017-06-09 RX ADMIN — LISINOPRIL SCH MG: 20 TABLET ORAL at 20:09

## 2017-06-09 RX ADMIN — METRONIDAZOLE SCH MG: 500 TABLET ORAL at 08:13

## 2017-06-09 RX ADMIN — METRONIDAZOLE SCH MG: 500 TABLET ORAL at 14:36

## 2017-06-09 RX ADMIN — Medication SCH INTER.UNIT: at 08:14

## 2017-06-09 RX ADMIN — LEVOTHYROXINE SODIUM SCH MCG: 75 TABLET ORAL at 06:20

## 2017-06-09 RX ADMIN — CEFUROXIME AXETIL SCH MG: 500 TABLET ORAL at 20:13

## 2017-06-09 RX ADMIN — CEFUROXIME AXETIL SCH MG: 500 TABLET ORAL at 08:15

## 2017-06-09 RX ADMIN — HEPARIN SODIUM SCH UNIT: 10000 INJECTION, SOLUTION INTRAVENOUS; SUBCUTANEOUS at 20:15

## 2017-06-09 RX ADMIN — SERTRALINE HYDROCHLORIDE SCH MG: 50 TABLET, FILM COATED ORAL at 20:08

## 2017-06-09 RX ADMIN — SODIUM CHLORIDE AND POTASSIUM CHLORIDE SCH MLS/HR: 9; 1.49 INJECTION, SOLUTION INTRAVENOUS at 04:07

## 2017-06-09 RX ADMIN — HYDROCHLOROTHIAZIDE SCH MG: 25 TABLET ORAL at 08:15

## 2017-06-09 RX ADMIN — SODIUM CHLORIDE AND POTASSIUM CHLORIDE SCH MLS/HR: 9; 1.49 INJECTION, SOLUTION INTRAVENOUS at 12:22

## 2017-06-09 RX ADMIN — Medication SCH MG: at 08:16

## 2017-06-09 RX ADMIN — HEPARIN SODIUM SCH UNIT: 10000 INJECTION, SOLUTION INTRAVENOUS; SUBCUTANEOUS at 09:04

## 2017-06-09 RX ADMIN — Medication SCH MG: at 20:08

## 2017-06-09 RX ADMIN — METRONIDAZOLE SCH MG: 500 TABLET ORAL at 20:09

## 2017-06-09 RX ADMIN — VERAPAMIL HYDROCHLORIDE SCH MG: 120 TABLET, FILM COATED, EXTENDED RELEASE ORAL at 08:13

## 2017-06-09 RX ADMIN — SODIUM CHLORIDE AND POTASSIUM CHLORIDE SCH MLS/HR: 9; 1.49 INJECTION, SOLUTION INTRAVENOUS at 20:20

## 2017-06-09 RX ADMIN — PANTOPRAZOLE SCH MG: 40 TABLET, DELAYED RELEASE ORAL at 08:16

## 2017-06-09 NOTE — PROGRESS NOTE
Subjective


Date of Service:


Jun 9, 2017.


Subjective


Pt evaluation today including:  conversation w/ patient, conversation w/ family

, physical exam, chart review, lab review, review of studies, conversation w/ 

consultant, review of inpatient medication list


No diarrhea since this morning, feeling 100% better,





Problem List


Medical Problems:


(1) Anemia


Status: Chronic  





(2) Diverticulosis Colon (W/O Ment Of Hemorrhage)


Status: Chronic  





(3) DJD (degenerative joint disease)


Status: Chronic  





(4) Esophageal Reflux


Status: Chronic  





(5) Hypertension


Status: Chronic  





(6) Hypothyroidism, Unspecified


Status: Chronic  





(7) Irritable Bowel Syndrome


Status: Chronic  





(8) Long-Term(Current)Use Of Steroids


Status: Chronic  





(9) Polymyalgia Rheumatica


Status: Chronic  





(10) Rheumatoid Arthritis, Unspecified


Status: Chronic  





(11) Unspecified Urinary Incontinence


Status: Chronic  





(12) Urinary tract infection


Status: Acute  





(13) Vitamin D Deficiency, Unspecified


Status: Chronic  





(14) Vomiting and diarrhea


Status: Acute  








Review of Systems


Constitutional:  No fever, No chills, No sweats, No weight loss, No weakness, 

No fatigue, No problem reported


Eyes:  No worsening of vision, No eye pain, No redness, No discharge, No 

diplopia


ENT:  No hearing loss, No unusual epistaxis, No nasal symptoms, No sore throat, 

No tinnitus, No dental problems, No trouble swallowing


Respiratory:  No cough, No sputum, No wheezing, No shortness of breath, No 

dyspnea on exertion, No dyspnea at rest, No hemoptysis


Cardiac:  No chest pain, No orthopnea, No PND, No edema, No claudication, No 

palpitations


Abdomen:  No pain, No nausea, No vomiting, No diarrhea, No constipation


Musculoskeletal:  No joint pain, No muscle pain, No swelling, No calf pain


Female :  No dysuria, No urinary frequency, No hematuria, No incontinence, No 

abnormal vaginal bleeding, No vaginal discharge


Neurologic:  No memory loss, No paralysis, No weakness, No numbness/tingling, 

No vertigo, No balance problems


Psychiatric:  No depression symptoms, No anhedonism, No anxiety, No insomnia, 

No substance abuse


Heme:  No abnormal bleeding/bruising, No clotting problems, No swollen lymph 

nodes, No night sweats


Endo:  No fatigue, No excessive thirst, No excessive urination


Skin:  No rash, No itch, No new/changing skin lesions, No color change, No 

bleeding





Objective


Vital Signs











  Date Time  Temp Pulse Resp B/P (MAP) Pulse Ox O2 Delivery O2 Flow Rate FiO2


 


6/9/17 08:00      Room Air  


 


6/9/17 07:25 36.8 67 20 155/77 (103) 97 Room Air  


 


6/9/17 00:32 36.4 71 16 136/70 (92) 97   


 


6/9/17 00:00      Room Air  


 


6/8/17 16:00     96 Room Air  


 


6/8/17 15:52 36.7 70 20 113/61 (78) 96 Room Air  











Physical Exam


General Appearance:  WD/WN, no apparent distress, + obese, + pertinent finding (

pleasant and smiling)


Eyes:  normal inspection, PERRL, EOMI, sclerae normal


ENT:  normal ENT inspection, hearing grossly normal, pharynx normal


Neck:  supple, no adenopathy, thyroid normal, no JVD, no carotid bruits, 

trachea midline


Respiratory/Chest:  chest non-tender, lungs clear, normal breath sounds, no 

respiratory distress, no accessory muscle use


Cardiovascular:  regular rate, rhythm, no edema, no gallop, no JVD, no murmur


Abdomen:  normal bowel sounds, non tender, soft, no organomegaly, no pulsatile 

mass


Extremities:  normal range of motion, non-tender, normal inspection, no pedal 

edema, no calf tenderness, normal capillary refill, pelvis stable


Neurologic/Psychiatric:  CNs II-XII nml as tested, no motor/sensory deficits, 

alert, normal mood/affect, oriented x 3


Skin:  normal color, warm/dry, no rash


Lymphatic:  no adenopathy





Laboratory Results





Last 24 Hours








Test


  6/9/17


06:26


 


White Blood Count 6.18 K/uL 


 


Red Blood Count 3.20 M/uL 


 


Hemoglobin 8.4 g/dL 


 


Hematocrit 26.4 % 


 


Mean Corpuscular Volume 82.5 fL 


 


Mean Corpuscular Hemoglobin 26.3 pg 


 


Mean Corpuscular Hemoglobin


Concent 31.8 g/dl 


 


 


Platelet Count 330 K/uL 


 


Mean Platelet Volume 9.5 fL 


 


Neutrophils (%) (Auto) 66.7 % 


 


Lymphocytes (%) (Auto) 23.1 % 


 


Monocytes (%) (Auto) 8.3 % 


 


Eosinophils (%) (Auto) 0.8 % 


 


Basophils (%) (Auto) 0.6 % 


 


Neutrophils # (Auto) 4.12 K/uL 


 


Lymphocytes # (Auto) 1.43 K/uL 


 


Monocytes # (Auto) 0.51 K/uL 


 


Eosinophils # (Auto) 0.05 K/uL 


 


Basophils # (Auto) 0.04 K/uL 


 


RDW Standard Deviation 43.9 fL 


 


RDW Coefficient of Variation 14.4 % 


 


Immature Granulocyte % (Auto) 0.5 % 


 


Immature Granulocyte # (Auto) 0.03 K/uL 


 


Hypersegmented Polys 1+ 


 


Sodium Level 138 mmol/L 


 


Potassium Level 4.1 mmol/L 


 


Chloride Level 104 mmol/L 


 


Carbon Dioxide Level 27 mmol/L 


 


Anion Gap 7.0 mmol/L 


 


Blood Urea Nitrogen 10 mg/dl 


 


Creatinine 0.97 mg/dl 


 


Est Creatinine Clear Calc


Drug Dose 43.2 ml/min 


 


 


Estimated GFR (


American) 63.9 


 


 


Estimated GFR (Non-


American 55.2 


 


 


BUN/Creatinine Ratio 10.2 


 


Random Glucose 84 mg/dl 


 


Calcium Level 8.5 mg/dl 


 


Magnesium Level 1.8 mg/dl 











Assessment and Plan


80 y/o female admitted on 6/5/2017 with possible gastritis with nausea, vomiting

, diarrhea, and weakness, found has diarrhea and C. difficile positive on 06/07/ 2017





Possible hydroxychloroquine -induced gastritis, resolved after discontinuation 

of hydroxychloroquine 


Patient was recently started on hydroxychloroquine when the symptoms began and 

symptoms improved when she stopped taking it.  


Abdominal CT unremarkable and does not show evidence of acute inflammation or 

infection. 


Patient presented to ER on 6/3 where she had a urinalysis and urine culture 

obtained.  Urine culture is back positive with Escherichia coli and a second 

gram-negative margie.  Patient received a dose of Rocephin IV in ED.  





New diagnosed C. difficile diarrhea, \


is her first time of this condition, not improved yet


Started the metronidazole by mouth 3 times a day from 06/07/2017, day 2 out of 

14


Significant improves


Continue current care





Hypokinemia, hypomagnesemia, replaced


Diarrhea and poor by mouth intake, possible dehydration, start IV fluid, follow-

up lytes





History of PMR, RS3PE syndrome, inflammatory symmetrical polyarthritis, 

hypertension, hypothyroidism, irritable bowel syndrome, and chronic reflux 

esophagitis who presented to the ED 





Possible drug-induced gastritis by hydroxychloroquine with  Nausea, vomiting, 

diarrhea--likely 


Symptom was totally resolved after holding hydroxychloroquine


Continue Stable and improved


- cont hold hydroxychloroquine, and follow-up with rheumatologist as instructed


-Advance diet as tolerated


-Continue Zofran 4 mg IV every 6 hours when necessary for nausea





Escherichia coli UTI POA has been on Rocephin since 6/5, which was changed to 

Ceftin, 5/7 days


Was changed to Ceftin


To be sensitive per culture results





Hypokalemia resolved


-Potassium 3.3 on arrival


-Potassium chloride 40 mEq PO x 1 upon admission improved


-Continue to monitor





Hyponatremia improved


-Sodium 130 on arrival


-IVF with NSS as above





PMR--per Dr. Coppola's records, this has been stable


RS3PE/inflammatory symmetrical polyarthritis--ongoing


-Hold hydroxychloroquine


- Discussed with Dr. coppola, recommend continue hold hydroxychloroquine, and 

follow-up with her as outpatient 2 week





HTN--stable


-Continue lisinopril 20 mg PO BID, hydrochlorothiazide 25 mg PO qd, and 

verapamil 120 mg PO qd





Hypothyroidism


-Continue Synthroid 75 g PO qd





Depression


-Continue Zoloft 25 mg PO qhs





DVT prophylaxis


-Heparin 5000 units SC q12h


-PEARL hose and SCDs





Code Status


-Level V, DO NOT RESUSCITATE


- PT OT recommend rehabilitation, talked to  for discharge tomorrow


- Possible after improving and diarrhea better





Discussed with patient about a care plan, answered all the questions,


Continued [facility] stay due to:  multiple IV medications needed


Discharge planning:  home

## 2017-06-10 VITALS — DIASTOLIC BLOOD PRESSURE: 60 MMHG | HEART RATE: 74 BPM | SYSTOLIC BLOOD PRESSURE: 172 MMHG

## 2017-06-10 VITALS
OXYGEN SATURATION: 97 % | SYSTOLIC BLOOD PRESSURE: 200 MMHG | TEMPERATURE: 97.88 F | DIASTOLIC BLOOD PRESSURE: 73 MMHG | HEART RATE: 72 BPM

## 2017-06-10 VITALS
DIASTOLIC BLOOD PRESSURE: 56 MMHG | HEART RATE: 73 BPM | SYSTOLIC BLOOD PRESSURE: 162 MMHG | TEMPERATURE: 98.06 F | OXYGEN SATURATION: 94 %

## 2017-06-10 VITALS
TEMPERATURE: 97.88 F | HEART RATE: 81 BPM | OXYGEN SATURATION: 95 % | DIASTOLIC BLOOD PRESSURE: 53 MMHG | SYSTOLIC BLOOD PRESSURE: 170 MMHG

## 2017-06-10 VITALS — HEART RATE: 83 BPM | DIASTOLIC BLOOD PRESSURE: 72 MMHG | SYSTOLIC BLOOD PRESSURE: 149 MMHG

## 2017-06-10 RX ADMIN — VERAPAMIL HYDROCHLORIDE SCH MG: 120 TABLET, FILM COATED, EXTENDED RELEASE ORAL at 08:15

## 2017-06-10 RX ADMIN — METRONIDAZOLE SCH MG: 500 TABLET ORAL at 08:16

## 2017-06-10 RX ADMIN — SERTRALINE HYDROCHLORIDE SCH MG: 50 TABLET, FILM COATED ORAL at 21:58

## 2017-06-10 RX ADMIN — LISINOPRIL SCH MG: 20 TABLET ORAL at 08:16

## 2017-06-10 RX ADMIN — CEFUROXIME AXETIL SCH MG: 500 TABLET ORAL at 21:56

## 2017-06-10 RX ADMIN — CEFUROXIME AXETIL SCH MG: 500 TABLET ORAL at 08:15

## 2017-06-10 RX ADMIN — LISINOPRIL SCH MG: 20 TABLET ORAL at 21:59

## 2017-06-10 RX ADMIN — HEPARIN SODIUM SCH UNIT: 10000 INJECTION, SOLUTION INTRAVENOUS; SUBCUTANEOUS at 22:03

## 2017-06-10 RX ADMIN — SODIUM CHLORIDE AND POTASSIUM CHLORIDE SCH MLS/HR: 9; 1.49 INJECTION, SOLUTION INTRAVENOUS at 04:13

## 2017-06-10 RX ADMIN — PANTOPRAZOLE SCH MG: 40 TABLET, DELAYED RELEASE ORAL at 08:15

## 2017-06-10 RX ADMIN — Medication SCH MG: at 08:14

## 2017-06-10 RX ADMIN — ACETAMINOPHEN PRN MG: 325 TABLET ORAL at 09:39

## 2017-06-10 RX ADMIN — METRONIDAZOLE SCH MG: 500 TABLET ORAL at 21:56

## 2017-06-10 RX ADMIN — LEVOTHYROXINE SODIUM SCH MCG: 75 TABLET ORAL at 06:43

## 2017-06-10 RX ADMIN — Medication SCH INTER.UNIT: at 08:16

## 2017-06-10 RX ADMIN — Medication SCH MG: at 21:57

## 2017-06-10 RX ADMIN — Medication SCH MG: at 08:15

## 2017-06-10 RX ADMIN — METRONIDAZOLE SCH MG: 500 TABLET ORAL at 14:20

## 2017-06-10 RX ADMIN — HEPARIN SODIUM SCH UNIT: 10000 INJECTION, SOLUTION INTRAVENOUS; SUBCUTANEOUS at 09:24

## 2017-06-10 RX ADMIN — HYDROCHLOROTHIAZIDE SCH MG: 25 TABLET ORAL at 08:14

## 2017-06-10 NOTE — DISCHARGE INSTRUCTIONS
Discharge Instructions


Date of Service


Mark 10, 2017.





Admission


Reason for Admission:  Nausea & Vomiting,Urinary Tract Infection,Weakness





Discharge


Discharge Diagnosis / Problem:  New diagnosed C. difficile diarrhea





Discharge Goals


Goal(s):  Decrease discomfort, Improve function, Increase independence, Improve 

disease control, Improve nutritional status, Learn about illness, Diagnostic 

testing, Therapeutic intervention, Prevent Disease Progression, Specific goals





Activity Recommendations


Activity Level:  Up Ad Trudy


Therapies:  Physical Therapy, Occupational Therapy





.





Additional Information


Patient informed of condition:  Yes


Advance Directives:  Yes


DNR:  Yes


Level of Care:  Skilled


Communicable Disease:  Yes


Prognosis:  Stable


Oxygen at (LPM):  no


Arango Catheter:  No





Instructions / Follow-Up


Instructions / Follow-Up


you possible have Possible hydroxychloroquine -induced gastritis, you need to 

cont hold hydroxychloroquine until you see by Dr. Coppola


you have New diagnosed C. difficile diarrhea need to continue antibiotics as 

instructed


ecoli uti: need to continue antibiotics as instructed


- you need to follow up with your primary care physician in 1 week,


- take medication as instructed, never overdose or any misuse, or take with 

alcohol,   because misuse of medicine may  cause organ damage or death, call 

your primary care physician if have questions of medicaitons.


- call your primary care physician OR go to local emergency room if has any 

fever/chill, chest pain, shortness of breathing, nausea/vomiting/abdominal pain

, facial droop/slurry speech/local weakness, or if has any questions. 


- fall precaution


- diet as instructed


- you need to follow up with your subspecialist 


- you should understand that it is important to follow up the above instruction

, and "not following the above instruction" may cause delayed or missed care of 

your medical conditions which may cause permanent organ damage and even death.





Current Hospital Diet


Patient's current hospital diet: AHA Diet (Heart Healthy)





Discharge Diet


Recommended Diet:  AHA Diet (Heart Healthy)





Procedures


Procedures Performed:  


no





Pending Studies


Studies pending at discharge:  no





Physician Orders On Transfer


POLST Discussion:  without POLST completion





Medical Emergencies








.


Who to Call and When:





Medical Emergencies:  If at any time you feel your situation is an emergency, 

please call 911 immediately.





.





Non-Emergent Contact


Non-Emergency issues call your:  Primary Care Provider, Specialist





.


.








"Provider Documentation" section prepared by Chan Barney.








.





Core Measure Problem


Core Measures:  None

## 2017-06-10 NOTE — PROGRESS NOTE
Subjective


Date of Service:


Mark 10, 2017.


Subjective


Pt evaluation today including:  conversation w/ patient, conversation w/ family

, physical exam, chart review, lab review, review of studies, review of 

inpatient medication list


Report has 4 time diarrhea since 4 AM, feeling tired, although the amount of 

diarrhea is not as bad as before the patient is concerned





Problem List


Medical Problems:


(1) Anemia


Status: Chronic  





(2) Diverticulosis Colon (W/O Ment Of Hemorrhage)


Status: Chronic  





(3) DJD (degenerative joint disease)


Status: Chronic  





(4) Esophageal Reflux


Status: Chronic  





(5) Hypertension


Status: Chronic  





(6) Hypothyroidism, Unspecified


Status: Chronic  





(7) Irritable Bowel Syndrome


Status: Chronic  





(8) Long-Term(Current)Use Of Steroids


Status: Chronic  





(9) Polymyalgia Rheumatica


Status: Chronic  





(10) Rheumatoid Arthritis, Unspecified


Status: Chronic  





(11) Unspecified Urinary Incontinence


Status: Chronic  





(12) Urinary tract infection


Status: Acute  





(13) Vitamin D Deficiency, Unspecified


Status: Chronic  





(14) Vomiting and diarrhea


Status: Acute  








Review of Systems


Constitutional:  No fever, No chills, No sweats, No weight loss, No weakness, 

No fatigue, No problem reported


Eyes:  No worsening of vision, No eye pain, No redness, No discharge, No 

diplopia


ENT:  No hearing loss, No unusual epistaxis, No nasal symptoms, No sore throat, 

No tinnitus, No dental problems, No trouble swallowing


Respiratory:  No cough, No sputum, No wheezing, No shortness of breath, No 

dyspnea on exertion, No dyspnea at rest, No hemoptysis


Cardiac:  No chest pain, No orthopnea, No PND, No edema, No claudication, No 

palpitations


Abdomen:  + nausea, + diarrhea, No pain, No vomiting, No constipation


Musculoskeletal:  No joint pain, No muscle pain, No swelling, No calf pain


Female :  No dysuria, No urinary frequency, No hematuria, No incontinence, No 

abnormal vaginal bleeding, No vaginal discharge


Neurologic:  No memory loss, No paralysis, No weakness, No numbness/tingling, 

No vertigo, No balance problems


Psychiatric:  No depression symptoms, No anhedonism, No anxiety, No insomnia, 

No substance abuse


Heme:  No abnormal bleeding/bruising, No clotting problems, No swollen lymph 

nodes, No night sweats


Endo:  No fatigue, No excessive thirst, No excessive urination


Skin:  No rash, No itch, No new/changing skin lesions, No color change, No 

bleeding





Objective


Vital Signs











  Date Time  Temp Pulse Resp B/P (MAP) Pulse Ox O2 Delivery O2 Flow Rate FiO2


 


6/10/17 08:24 36.6 72 18 185/73 (110) 97 Room Air  





    200/73 (115)    


 


6/10/17 00:09 36.7 73 16 162/56 (91) 94 Room Air  


 


6/10/17 00:00      Room Air  


 


6/9/17 20:09  64  132/68 (89)    


 


6/9/17 20:00      Room Air  


 


6/9/17 16:09 36.3 64 19 134/72 (92) 98 Room Air  


 


6/9/17 16:00      Room Air  











Physical Exam


General Appearance:  WD/WN, no apparent distress, + obese


Eyes:  normal inspection, PERRL, EOMI, sclerae normal


ENT:  normal ENT inspection, hearing grossly normal, pharynx normal


Neck:  supple, no adenopathy, thyroid normal, no JVD, no carotid bruits, 

trachea midline


Respiratory/Chest:  chest non-tender, lungs clear, normal breath sounds, no 

respiratory distress, no accessory muscle use


Cardiovascular:  regular rate, rhythm, no edema, no gallop, no JVD, no murmur


Abdomen:  normal bowel sounds, non tender, soft, no organomegaly, no pulsatile 

mass


Extremities:  normal range of motion, non-tender, normal inspection, no pedal 

edema, no calf tenderness, normal capillary refill, pelvis stable


Neurologic/Psychiatric:  CNs II-XII nml as tested, no motor/sensory deficits, 

alert, normal mood/affect, oriented x 3


Skin:  normal color, warm/dry, no rash


Lymphatic:  no adenopathy





Assessment and Plan


80 y/o female admitted on 6/5/2017 with possible gastritis with nausea, vomiting

, diarrhea, and weakness, found has diarrhea and C. difficile positive on 06/07/ 2017





New diagnosed C. difficile diarrhea


is her first time of this condition, was improved yesterday


But this morning seems have some moderate diarrhea


Started the metronidazole by mouth 3 times a day from 06/07/2017, day 3 out of 

14


Continue current care, watch for the diarrhea, and lytes





Escherichia coli UTI : Patient presented to ER on 6/3 where she had a 

urinalysis and urine culture obtained.  Urine culture is back positive with 

Escherichia coli and a second gram-negative margie.  Patient received a dose of 

Rocephin IV in ED.  Rocephin was changed to oral Ceftin





Hypokinemia, hypomagnesemia, replaced





Diarrhea and poor by mouth intake, possible dehydration, was on IV fluid, 

resolved





History of PMR, RS3PE syndrome, inflammatory symmetrical polyarthritis, 

hypertension, hypothyroidism, irritable bowel syndrome, and chronic reflux 

esophagitis who presented to the ED 





Possible drug-induced gastritis by hydroxychloroquine with  Nausea, vomiting, 

diarrhea--likely 


Symptom was totally resolved after holding hydroxychloroquine


Continue Stable and improved


- cont hold hydroxychloroquine, and follow-up with rheumatologist as instructed


-Advance diet as tolerated


-Continue Zofran 4 mg IV every 6 hours when necessary for nausea





Hyponatremia improved


-Sodium 130 on arrival


-IVF with NSS as above





PMR--per Dr. Coppola's records, this has been stable


RS3PE/inflammatory symmetrical polyarthritis--ongoing


-Hold hydroxychloroquine


- Discussed with Dr. coppola, recommend continue hold hydroxychloroquine, and 

follow-up with her as outpatient 2 week





HTN--stable


-Continue lisinopril 20 mg PO BID, hydrochlorothiazide 25 mg PO qd, and 

verapamil 120 mg PO qd





Hypothyroidism


-Continue Synthroid 75 g PO qd





Depression


-Continue Zoloft 25 mg PO qhs





DVT prophylaxis


-Heparin 5000 units SC q12h


-PEARL hose and SCDs





Code Status


-Level V, DO NOT RESUSCITATE


- PT OT recommend rehabilitation, 


hold dc today because some moderate diarrhea, talked to  for 

discharge tomorrow 


Discussed with patient about a care plan, answered all the questions,


Continued [facility] stay due to:  home environment unsafe for pt


Discharge planning:  home

## 2017-06-11 VITALS
HEART RATE: 83 BPM | TEMPERATURE: 98.06 F | SYSTOLIC BLOOD PRESSURE: 158 MMHG | OXYGEN SATURATION: 95 % | DIASTOLIC BLOOD PRESSURE: 68 MMHG

## 2017-06-11 VITALS
SYSTOLIC BLOOD PRESSURE: 134 MMHG | DIASTOLIC BLOOD PRESSURE: 75 MMHG | OXYGEN SATURATION: 96 % | HEART RATE: 62 BPM | TEMPERATURE: 97.88 F

## 2017-06-11 VITALS — SYSTOLIC BLOOD PRESSURE: 177 MMHG | HEART RATE: 92 BPM | DIASTOLIC BLOOD PRESSURE: 72 MMHG

## 2017-06-11 VITALS
OXYGEN SATURATION: 97 % | TEMPERATURE: 98.06 F | SYSTOLIC BLOOD PRESSURE: 149 MMHG | HEART RATE: 70 BPM | DIASTOLIC BLOOD PRESSURE: 70 MMHG

## 2017-06-11 VITALS
HEART RATE: 93 BPM | DIASTOLIC BLOOD PRESSURE: 76 MMHG | OXYGEN SATURATION: 97 % | SYSTOLIC BLOOD PRESSURE: 185 MMHG | TEMPERATURE: 97.88 F

## 2017-06-11 LAB
ANION GAP SERPL CALC-SCNC: 9 MMOL/L (ref 3–11)
BUN SERPL-MCNC: 12 MG/DL (ref 7–18)
BUN/CREAT SERPL: 14.1 (ref 10–20)
CALCIUM SERPL-MCNC: 8.7 MG/DL (ref 8.5–10.1)
CHLORIDE SERPL-SCNC: 103 MMOL/L (ref 98–107)
CO2 SERPL-SCNC: 26 MMOL/L (ref 21–32)
CREAT CL PREDICTED SERPL C-G-VRATE: 47.6 ML/MIN
CREAT SERPL-MCNC: 0.88 MG/DL (ref 0.6–1.2)
EOSINOPHIL NFR BLD AUTO: 339 K/UL (ref 130–400)
FERRITIN SERPL-MCNC: 224.7 NG/ML (ref 8–388)
GLUCOSE SERPL-MCNC: 88 MG/DL (ref 70–99)
HCT VFR BLD CALC: 26 % (ref 37–47)
MAGNESIUM SERPL-MCNC: 1.8 MG/DL (ref 1.8–2.4)
MCH RBC QN AUTO: 25.1 PG (ref 25–34)
MCHC RBC AUTO-ENTMCNC: 30.4 G/DL (ref 32–36)
MCV RBC AUTO: 82.5 FL (ref 80–100)
PMV BLD AUTO: 9.3 FL (ref 7.4–10.4)
POTASSIUM SERPL-SCNC: 4 MMOL/L (ref 3.5–5.1)
RBC # BLD AUTO: 3.15 M/UL (ref 4.2–5.4)
SODIUM SERPL-SCNC: 138 MMOL/L (ref 136–145)
TIBC SERPL-MCNC: 213 MCG/DL (ref 250–450)
WBC # BLD AUTO: 7.22 K/UL (ref 4.8–10.8)

## 2017-06-11 RX ADMIN — Medication SCH MG: at 20:26

## 2017-06-11 RX ADMIN — CEFUROXIME AXETIL SCH MG: 500 TABLET ORAL at 20:26

## 2017-06-11 RX ADMIN — Medication SCH INTER.UNIT: at 07:55

## 2017-06-11 RX ADMIN — METRONIDAZOLE SCH MG: 500 TABLET ORAL at 13:45

## 2017-06-11 RX ADMIN — VANCOMYCIN HYDROCHLORIDE SCH MG: 1 INJECTION, POWDER, LYOPHILIZED, FOR SOLUTION INTRAVENOUS at 20:18

## 2017-06-11 RX ADMIN — Medication SCH MG: at 07:56

## 2017-06-11 RX ADMIN — ACETAMINOPHEN PRN MG: 325 TABLET ORAL at 09:04

## 2017-06-11 RX ADMIN — LEVOTHYROXINE SODIUM SCH MCG: 75 TABLET ORAL at 05:54

## 2017-06-11 RX ADMIN — Medication SCH ML: at 20:18

## 2017-06-11 RX ADMIN — Medication SCH ML: at 15:05

## 2017-06-11 RX ADMIN — VERAPAMIL HYDROCHLORIDE SCH MG: 120 TABLET, FILM COATED, EXTENDED RELEASE ORAL at 07:55

## 2017-06-11 RX ADMIN — SERTRALINE HYDROCHLORIDE SCH MG: 50 TABLET, FILM COATED ORAL at 20:27

## 2017-06-11 RX ADMIN — VANCOMYCIN HYDROCHLORIDE SCH MG: 1 INJECTION, POWDER, LYOPHILIZED, FOR SOLUTION INTRAVENOUS at 15:05

## 2017-06-11 RX ADMIN — Medication SCH MG: at 08:58

## 2017-06-11 RX ADMIN — PANTOPRAZOLE SCH MG: 40 TABLET, DELAYED RELEASE ORAL at 07:55

## 2017-06-11 RX ADMIN — METRONIDAZOLE SCH MG: 500 TABLET ORAL at 07:57

## 2017-06-11 RX ADMIN — LISINOPRIL SCH MG: 20 TABLET ORAL at 07:57

## 2017-06-11 RX ADMIN — HYDROCHLOROTHIAZIDE SCH MG: 25 TABLET ORAL at 07:53

## 2017-06-11 RX ADMIN — HEPARIN SODIUM SCH UNIT: 10000 INJECTION, SOLUTION INTRAVENOUS; SUBCUTANEOUS at 09:00

## 2017-06-11 RX ADMIN — LISINOPRIL SCH MG: 20 TABLET ORAL at 20:25

## 2017-06-11 RX ADMIN — CEFUROXIME AXETIL SCH MG: 500 TABLET ORAL at 07:54

## 2017-06-11 RX ADMIN — METOPROLOL TARTRATE SCH MG: 25 TABLET, FILM COATED ORAL at 20:18

## 2017-06-11 RX ADMIN — Medication SCH ML: at 17:32

## 2017-06-11 RX ADMIN — VANCOMYCIN HYDROCHLORIDE SCH MG: 1 INJECTION, POWDER, LYOPHILIZED, FOR SOLUTION INTRAVENOUS at 17:32

## 2017-06-11 NOTE — PROGRESS NOTE
Subjective


Date of Service:


Jun 11, 2017.


Subjective


Pt evaluation today including:  conversation w/ patient, conversation w/ family

, physical exam, chart review, lab review, review of studies, conversation w/ 

consultant, review of inpatient medication list


Reported no appetite, general weakness, but is getting better, still has 

diarrhea 3-4 times this morning, 


No fever and chill


No blood in the stool





Problem List


Medical Problems:


(1) Anemia


Status: Chronic  





(2) Diverticulosis Colon (W/O Ment Of Hemorrhage)


Status: Chronic  





(3) DJD (degenerative joint disease)


Status: Chronic  





(4) Esophageal Reflux


Status: Chronic  





(5) Hypertension


Status: Chronic  





(6) Hypothyroidism, Unspecified


Status: Chronic  





(7) Irritable Bowel Syndrome


Status: Chronic  





(8) Long-Term(Current)Use Of Steroids


Status: Chronic  





(9) Polymyalgia Rheumatica


Status: Chronic  





(10) Rheumatoid Arthritis, Unspecified


Status: Chronic  





(11) Unspecified Urinary Incontinence


Status: Chronic  





(12) Urinary tract infection


Status: Acute  





(13) Vitamin D Deficiency, Unspecified


Status: Chronic  





(14) Vomiting and diarrhea


Status: Acute  








Review of Systems


Constitutional:  + weakness, + fatigue, No fever, No chills, No sweats, No 

weight loss, No problem reported


Eyes:  No worsening of vision, No eye pain, No redness, No discharge, No 

diplopia


ENT:  No hearing loss, No unusual epistaxis, No nasal symptoms, No sore throat, 

No tinnitus, No dental problems, No trouble swallowing


Respiratory:  No cough, No sputum, No wheezing, No shortness of breath, No 

dyspnea on exertion, No dyspnea at rest, No hemoptysis


Cardiac:  No chest pain, No orthopnea, No PND, No edema, No claudication, No 

palpitations


Abdomen:  + diarrhea, No pain, No nausea, No vomiting, No constipation


Musculoskeletal:  No joint pain, No muscle pain, No swelling, No calf pain


Female :  No dysuria, No urinary frequency, No hematuria, No incontinence, No 

abnormal vaginal bleeding, No vaginal discharge


Neurologic:  No memory loss, No paralysis, No weakness, No numbness/tingling, 

No vertigo, No balance problems


Psychiatric:  No depression symptoms, No anhedonism, No anxiety, No insomnia, 

No substance abuse


Heme:  No abnormal bleeding/bruising, No clotting problems, No swollen lymph 

nodes, No night sweats


Endo:  No fatigue, No excessive thirst, No excessive urination


Skin:  No rash, No itch, No new/changing skin lesions, No color change, No 

bleeding





Objective


Vital Signs











  Date Time  Temp Pulse Resp B/P (MAP) Pulse Ox O2 Delivery O2 Flow Rate FiO2


 


6/11/17 10:20  92  177/72 (107)    


 


6/11/17 08:00      Room Air  


 


6/11/17 07:16 36.6 93 18 185/76 (112) 97 Room Air  


 


6/11/17 00:48 36.7 83 17 158/68 (98) 95 Room Air  


 


6/11/17 00:00      Room Air  


 


6/10/17 21:58  74  172/60 (97)    


 


6/10/17 20:00      Room Air  


 


6/10/17 16:00      Room Air  


 


6/10/17 15:48 36.6 81 20 170/53 (92) 95 Room Air  











Physical Exam


General Appearance:  WD/WN, no apparent distress


Eyes:  normal inspection, PERRL, EOMI, sclerae normal


ENT:  normal ENT inspection, hearing grossly normal, pharynx normal


Neck:  supple, no adenopathy, thyroid normal, no JVD, no carotid bruits, 

trachea midline


Respiratory/Chest:  chest non-tender, normal breath sounds, no respiratory 

distress, no accessory muscle use, + decreased breath sounds


Cardiovascular:  regular rate, rhythm, no edema, no gallop, no JVD, no murmur


Abdomen:  normal bowel sounds, non tender, soft, no organomegaly, no pulsatile 

mass


Extremities:  normal range of motion, non-tender, normal inspection, no pedal 

edema, no calf tenderness, normal capillary refill, pelvis stable


Neurologic/Psychiatric:  CNs II-XII nml as tested, no motor/sensory deficits, 

alert, normal mood/affect, oriented x 3


Skin:  normal color, warm/dry, no rash


Lymphatic:  no adenopathy





Laboratory Results





Last 24 Hours








Test


  6/11/17


05:47 6/11/17


08:10 6/11/17


08:16


 


White Blood Count 7.22 K/uL   


 


Red Blood Count 3.15 M/uL   


 


Hemoglobin 7.9 g/dL   


 


Hematocrit 26.0 %   


 


Mean Corpuscular Volume 82.5 fL   


 


Mean Corpuscular Hemoglobin 25.1 pg   


 


Mean Corpuscular Hemoglobin


Concent 30.4 g/dl 


  


  


 


 


RDW Standard Deviation 45.3 fL   


 


RDW Coefficient of Variation 15.0 %   


 


Platelet Count 339 K/uL   


 


Mean Platelet Volume 9.3 fL   


 


Sodium Level 138 mmol/L   


 


Potassium Level 4.0 mmol/L   


 


Chloride Level 103 mmol/L   


 


Carbon Dioxide Level 26 mmol/L   


 


Anion Gap 9.0 mmol/L   


 


Blood Urea Nitrogen 12 mg/dl   


 


Creatinine 0.88 mg/dl   


 


Est Creatinine Clear Calc


Drug Dose 47.6 ml/min 


  


  


 


 


Estimated GFR (


American) 71.9 


  


  


 


 


Estimated GFR (Non-


American 62.1 


  


  


 


 


BUN/Creatinine Ratio 14.1   


 


Random Glucose 88 mg/dl   


 


Calcium Level 8.7 mg/dl   


 


Magnesium Level 1.8 mg/dl   


 


Stool Occult Blood  POSITIVE  


 


Total Iron Binding Capacity   213 mcg/dl 


 


Ferritin   224.7 ng/ml 


 


Vitamin B12 Level   1735 pg/mL 


 


Folate   6.13 ng/mL 











Assessment and Plan


80 y/o female admitted on 6/5/2017 with possible gastritis with nausea, vomiting

, diarrhea, and weakness, found has diarrhea and C. difficile positive on 06/07/ 2017





New diagnosed C. difficile diarrhea, was improved but it getting worse again 

for 2 days already


is her first time of this condition, 


Initially was started on the metronidazole by mouth 3 times a day from 06/07/ 2017,


I changed to oral vancomycin today because of worsening diarrhea for 2 days, 

patient has underlined possible immunodeficiency secondary to taking  oral 

prednisone for rheumatoid disease





Escherichia coli UTI : Patient presented to ER on 6/3 where she had a 

urinalysis and urine culture obtained.  Urine culture is back positive with 

Escherichia coli and a second gram-negative margie.  Patient received a dose of 

Rocephin IV in ED.  Rocephin was changed to oral Ceftin, today will be last 

dose of Ceftin.





Heme positive stool with possible GI bleeding with decreased hemoglobin level


Is checking anemia panel


, patient had colonoscopy 5 years ago by Dr. Lagunas, has request consultation





Hypokinemia, hypomagnesemia, replaced





Diarrhea and poor by mouth intake, possible dehydration, was on IV fluid, 

resolved





History of PMR, RS3PE syndrome, inflammatory symmetrical polyarthritis, 

hypertension, hypothyroidism, irritable bowel syndrome, and chronic reflux 

esophagitis who presented to the ED 





Possible drug-induced gastritis by hydroxychloroquine with  Nausea, vomiting, 

diarrhea upon admission--likely 


Symptom was totally resolved after holding hydroxychloroquine


Continue Stable and improved


- Talked to the patient's dermatologist Dr. Coppola, cont hold hydroxychloroquine, 

and follow-up with rheumatologist as instructed


-Advance diet as tolerated


-Continue Zofran 4 mg IV every 6 hours when necessary for nausea





Hyponatremia improved


-Sodium 130 on arrival


- Encourage oral intake





PMR--per Dr. Coppola's records, this has been stable


RS3PE/inflammatory symmetrical polyarthritis--ongoing


-Hold hydroxychloroquine


- Discussed with Dr. coppola, recommend continue hold hydroxychloroquine, and 

follow-up with her as outpatient 2 week





HTN--not well controlled, continue lisinopril 20 mg PO BID, hydrochlorothiazide 

25 mg PO qd, and verapamil 120 mg PO qd


add metoprolol 12.5 mg  by mouth twice a day yesterday, we'll increase to 25 mg 

by mouth twice a day





Hypothyroidism


-Continue Synthroid 75 g PO qd





Depression


-Continue Zoloft 25 mg PO qhs





DVT prophylaxis


-Heparin 5000 units SC q12h


-PEARL hose and SCDs





Code Status


-Level V, DO NOT RESUSCITATE


- PT OT recommend rehabilitation,  has a bed available at nursing home 

rehabilitation in  [facility], can be discharged when medically ready


Continued [facility] stay due to:  home environment unsafe for pt


Discharge planning:  home

## 2017-06-11 NOTE — MEDICAL CONSULT
Consultation


Date of Consultation:


Jun 11, 2017.


Attending Physician:


Shyam Szymanski M.D.


Reason for Consultation:


C. diff diarrhea


History of Present Illness


81-year-old female with history of polymyalgia rheumatica recently started on 

hydroxychloroquine with developed symptoms consistent with urinary tract 

infection earlier this month, was found a positive culture for E coli.  She was 

treated with a course of oral cefuroxime, but for 3-4 days prior to her 

admission, she developed nausea, vomiting, and diarrhea.  She was found with 

have positive PCR for C difficile and was initially started on Flagyl.  However 

symptoms persisted, and has now been changed to oral vancomycin therapy.  Still 

with 3-4 diarrheal bowel movements with daily.  Has remained afebrile, 

hemodynamically stable.  No new urinary symptoms.





Past Medical/Surgical History


Medical Problems:


(1) Anemia


Status: Chronic  





(2) Diverticulosis Colon (W/O Ment Of Hemorrhage)


Status: Chronic  





(3) DJD (degenerative joint disease)


Status: Chronic  





(4) Esophageal Reflux


Status: Chronic  





(5) Hypertension


Status: Chronic  





(6) Hypothyroidism, Unspecified


Status: Chronic  





(7) Irritable Bowel Syndrome


Status: Chronic  





(8) Long-Term(Current)Use Of Steroids


Status: Chronic  





(9) Polymyalgia Rheumatica


Status: Chronic  





(10) Rheumatoid Arthritis, Unspecified


Status: Chronic  





(11) Unspecified Urinary Incontinence


Status: Chronic  





(12) Urinary tract infection


Status: Acute  





(13) Vitamin D Deficiency, Unspecified


Status: Chronic  





(14) Vomiting and diarrhea


Status: Acute  





Medical Problems:


(1) Acute kidney injury


(2) Altered mental status


(3) Anemia


(4) Dehydration


(5) Diarrhea


(6) Diverticulosis Colon (W/O Ment Of Hemorrhage)


(7) Dizziness


(8) DJD (degenerative joint disease)


(9) Esophageal Reflux


(10) Headache


(11) Hypertension


(12) Hypokalemia


(13) Hypomagnesemia


(14) Hyponatremia


(15) Hypothyroidism, Unspecified


(16) Irritable Bowel Syndrome


(17) Long-Term(Current)Use Of Steroids


(18) Medication reaction


(19) Nausea & vomiting


(20) Polymyalgia Rheumatica


(21) Renal insufficiency


(22) Rheumatoid Arthritis, Unspecified


(23) SIRS (systemic inflammatory response syndrome)


(24) Syncopal episodes


(25) Syncope


(26) Thoracic radiculopathy


(27) Unspecified Urinary Incontinence


(28) Vitamin D Deficiency, Unspecified


(29) Weakness


Surgical Problems:


(1) History of hysterectomy


(2) Knee Joint Replacement Status











Family History





Heart disease


Hypertension





Social History


Smoking Status:  Never Smoker


Smokeless Tobacco Use:  No


Alcohol Use:  none


Drug Use:  none


Marital Status:  


Housing Status:  lives with family


Occupation Status:  retired





Allergies


Coded Allergies:  


     Sulfa Antibiotics (Verified  Allergy, Intermediate, RASH, 6/5/17)


     Erythromycin (Verified  Adverse Reaction, Unknown, N&V, 6/5/17)


 N&V





Current Inpatient Medications





Current Inpatient Medications








 Medications


  (Trade)  Dose


 Ordered  Sig/Robinson


 Route  Start Time


 Stop Time Status Last Admin


Dose Admin


 


 Acetaminophen


  (Tylenol Tab)  650 mg  Q4H  PRN


 PO  6/5/17 18:00


 7/5/17 17:59  6/11/17 09:04


650 MG


 


 Al Hydrox/Mg


 Hydrox/Simethicone


  (Maalox Max Susp)  15 ml  Q4H  PRN


 PO  6/5/17 18:00


 7/5/17 17:59   


 


 


 Magnesium


 Hydroxide


  (Milk Of


 Magnesia Susp)  30 ml  Q6H  PRN


 PO  6/5/17 18:00


 7/5/17 17:59   


 


 


 Polyethylene


  (Miralax Powder


 Packet)  17 gm  DAILY  PRN


 PO  6/5/17 18:00


 7/5/17 17:59   


 


 


 Ondansetron HCl


  (Zofran Inj)  4 mg  Q6H  PRN


 IV  6/5/17 18:00


 7/5/17 17:59  6/8/17 06:38


4 MG


 


 Hydrochlorothiazide


  (Hydrochlorothiazide


 Tab)  25 mg  DAILY


 PO  6/6/17 08:00


 7/6/17 08:59  6/11/17 07:53


25 MG


 


 Levothyroxine


 Sodium


  (Synthroid Tab)  75 mcg  DAILYBB


 PO  6/6/17 06:30


 7/6/17 06:29  6/11/17 05:54


75 MCG


 


 Lisinopril


  (Zestril Tab)  20 mg  BID


 PO  6/5/17 20:00


 7/5/17 20:59  6/11/17 20:25


20 MG


 


 Sertraline HCl


  (Zoloft Tab)  25 mg  HS


 PO  6/5/17 21:00


 7/5/17 20:59  6/11/17 20:27


25 MG


 


 Verapamil HCl


  (Calan-Sr Tab)  120 mg  DAILY


 PO  6/6/17 08:00


 7/6/17 08:59  6/11/17 07:55


120 MG


 


 Pantoprazole


 Sodium


  (Protonix Tab)  40 mg  QAM


 PO  6/6/17 08:00


 7/6/17 08:59  6/11/17 07:55


40 MG


 


 Cholecalciferol


  (Vitamin D Tab)  5,000


 inter.unit  DAILY


 PO  6/6/17 08:00


 7/6/17 07:59  6/11/17 07:55


5,000 INTER.UNIT


 


 Hydralazine HCl


  (HydrALAZINE INJ)  20 mg  Q8  PRN


 IV.  6/6/17 08:00


 7/6/17 07:59   


 


 


 Prednisone


  (PredniSONE TAB)  10 mg  DAILY


 PO  6/7/17 08:00


 7/6/17 08:59  6/11/17 07:54


10 MG


 


 Magnesium Oxide


  (Mag-Ox Tab)  400 mg  BID


 PO  6/8/17 20:00


 7/8/17 19:59  6/11/17 20:26


400 MG


 


 Saccharomyces


 Boulardii


  (Florastor Cap)  250 mg  DAILY


 PO  6/8/17 12:30


 7/8/17 12:29  6/11/17 07:56


250 MG


 


 Cefuroxime Axetil


  (Ceftin Tab)  500 mg  BID


 PO  6/8/17 13:21


 6/12/17 13:20  6/11/17 20:26


500 MG


 


 Metoprolol


 Tartrate


  (Lopressor Tab)  25 mg  BID


 PO  6/11/17 20:00


 7/11/17 09:29  6/11/17 20:18


25 MG


 


 Vancomycin HCl


  (Vancomycin Oral


 Soln)  125 mg  QID


 PO  6/11/17 14:30


 6/21/17 14:29  6/11/17 20:18


125 MG


 


 Raspberry


  (Raspberry Syrup


 5ml Cup)  5 ml  QID


 PO  6/11/17 14:30


 6/25/17 14:29  6/11/17 20:18


5 ML











Review of Systems


Constitutional:  + weakness, + fatigue, No fever


Eyes:  No problem reported


ENT:  No problem reported


Respiratory:  No problem reported


Cardiovascular:  No problem reported


Abdomen:  + diarrhea


Musculoskeletal:  No problem reported


Genitourinary - Female:  No problem reported


Neurologic:  No problem reported


Psychiatric:  No problem reported


Endocrine:  No problem reported


Hematologic / Lymphatic:  No problem reported


Integumentary:  No problem reported


Allergic / Immunologic:  No problem reported





Physical Exam











  Date Time  Temp Pulse Resp B/P (MAP) Pulse Ox O2 Delivery O2 Flow Rate FiO2


 


6/11/17 16:00      Room Air  


 


6/11/17 16:00 36.6 62 18 134/75 (94) 96 Room Air  


 


6/11/17 10:20  92  177/72 (107)    


 


6/11/17 08:00      Room Air  


 


6/11/17 07:16 36.6 93 18 185/76 (112) 97 Room Air  


 


6/11/17 00:48 36.7 83 17 158/68 (98) 95 Room Air  


 


6/11/17 00:00      Room Air  


 


6/10/17 21:58  74  172/60 (97)    








General Appearance:  WD/WN, no apparent distress


Head:  normocephalic, atraumatic


Eyes:  normal inspection, EOMI, sclerae normal


ENT:  normal ENT inspection, pharynx normal


Neck:  supple, no adenopathy, thyroid normal, trachea midline


Respiratory/Chest:  chest non-tender, lungs clear, normal breath sounds, no 

respiratory distress


Cardiovascular:  regular rate, rhythm, no gallop, no murmur


Abdomen/GI:  normal bowel sounds, non tender, soft, no organomegaly


Back:  normal inspection, no CVA tenderness


Extremities/Musculoskelatal:  no calf tenderness, non-tender


Neurologic/Psych:  alert, oriented x 3


Skin:  normal color, warm/dry, no rash


Lymphatic:  no adenopathy





Laboratory Results





Last 24 Hours








Test


  6/11/17


05:47 6/11/17


08:10 6/11/17


08:16


 


White Blood Count 7.22 K/uL   


 


Red Blood Count 3.15 M/uL   


 


Hemoglobin 7.9 g/dL   


 


Hematocrit 26.0 %   


 


Mean Corpuscular Volume 82.5 fL   


 


Mean Corpuscular Hemoglobin 25.1 pg   


 


Mean Corpuscular Hemoglobin


Concent 30.4 g/dl 


  


  


 


 


RDW Standard Deviation 45.3 fL   


 


RDW Coefficient of Variation 15.0 %   


 


Platelet Count 339 K/uL   


 


Mean Platelet Volume 9.3 fL   


 


Sodium Level 138 mmol/L   


 


Potassium Level 4.0 mmol/L   


 


Chloride Level 103 mmol/L   


 


Carbon Dioxide Level 26 mmol/L   


 


Anion Gap 9.0 mmol/L   


 


Blood Urea Nitrogen 12 mg/dl   


 


Creatinine 0.88 mg/dl   


 


Est Creatinine Clear Calc


Drug Dose 47.6 ml/min 


  


  


 


 


Estimated GFR (


American) 71.9 


  


  


 


 


Estimated GFR (Non-


American 62.1 


  


  


 


 


BUN/Creatinine Ratio 14.1   


 


Random Glucose 88 mg/dl   


 


Calcium Level 8.7 mg/dl   


 


Magnesium Level 1.8 mg/dl   


 


Stool Occult Blood  POSITIVE  


 


Total Iron Binding Capacity   213 mcg/dl 


 


Ferritin   224.7 ng/ml 


 


Vitamin B12 Level   1735 pg/mL 


 


Folate   6.13 ng/mL 











Assessment & Plan


Clostridium difficile colitis in setting of treatment for E. coli UTI. 

Vancomycin appropriate therapy, would give prolonged tapering course given need 

for treatment of UTI with high risk of recurrent infection. Will discuss with 

all involved and follow.

## 2017-06-12 VITALS
DIASTOLIC BLOOD PRESSURE: 73 MMHG | TEMPERATURE: 98.24 F | OXYGEN SATURATION: 93 % | HEART RATE: 63 BPM | SYSTOLIC BLOOD PRESSURE: 151 MMHG

## 2017-06-12 VITALS
TEMPERATURE: 98.42 F | OXYGEN SATURATION: 96 % | DIASTOLIC BLOOD PRESSURE: 56 MMHG | SYSTOLIC BLOOD PRESSURE: 115 MMHG | HEART RATE: 56 BPM

## 2017-06-12 VITALS — DIASTOLIC BLOOD PRESSURE: 70 MMHG | SYSTOLIC BLOOD PRESSURE: 130 MMHG | HEART RATE: 67 BPM

## 2017-06-12 VITALS
SYSTOLIC BLOOD PRESSURE: 185 MMHG | DIASTOLIC BLOOD PRESSURE: 79 MMHG | HEART RATE: 74 BPM | TEMPERATURE: 98.06 F | OXYGEN SATURATION: 96 %

## 2017-06-12 VITALS — OXYGEN SATURATION: 96 %

## 2017-06-12 LAB
ANION GAP SERPL CALC-SCNC: 8 MMOL/L (ref 3–11)
BASOPHILS # BLD: 0.05 K/UL (ref 0–0.2)
BASOPHILS NFR BLD: 0.7 %
BUN SERPL-MCNC: 14 MG/DL (ref 7–18)
BUN/CREAT SERPL: 14.5 (ref 10–20)
CALCIUM SERPL-MCNC: 8.9 MG/DL (ref 8.5–10.1)
CHLORIDE SERPL-SCNC: 101 MMOL/L (ref 98–107)
CO2 SERPL-SCNC: 28 MMOL/L (ref 21–32)
COMPLETE: YES
CREAT CL PREDICTED SERPL C-G-VRATE: 45.1 ML/MIN
CREAT SERPL-MCNC: 0.93 MG/DL (ref 0.6–1.2)
EOSINOPHIL NFR BLD AUTO: 356 K/UL (ref 130–400)
GLUCOSE SERPL-MCNC: 87 MG/DL (ref 70–99)
HCT VFR BLD CALC: 27.5 % (ref 37–47)
IG%: 1.1 %
IMM GRANULOCYTES NFR BLD AUTO: 31.8 %
LYMPHOCYTES # BLD: 2.27 K/UL (ref 1.2–3.4)
MAGNESIUM SERPL-MCNC: 1.9 MG/DL (ref 1.8–2.4)
MCH RBC QN AUTO: 26.4 PG (ref 25–34)
MCHC RBC AUTO-ENTMCNC: 31.6 G/DL (ref 32–36)
MCV RBC AUTO: 83.3 FL (ref 80–100)
MONOCYTES NFR BLD: 9.9 %
NEUTROPHILS # BLD AUTO: 1.3 %
NEUTROPHILS NFR BLD AUTO: 55.2 %
PMV BLD AUTO: 9.8 FL (ref 7.4–10.4)
POTASSIUM SERPL-SCNC: 3.8 MMOL/L (ref 3.5–5.1)
RBC # BLD AUTO: 3.3 M/UL (ref 4.2–5.4)
SODIUM SERPL-SCNC: 137 MMOL/L (ref 136–145)
WBC # BLD AUTO: 7.14 K/UL (ref 4.8–10.8)

## 2017-06-12 RX ADMIN — METOPROLOL TARTRATE SCH MG: 25 TABLET, FILM COATED ORAL at 20:30

## 2017-06-12 RX ADMIN — Medication SCH ML: at 08:03

## 2017-06-12 RX ADMIN — Medication SCH MG: at 20:29

## 2017-06-12 RX ADMIN — LISINOPRIL SCH MG: 20 TABLET ORAL at 08:04

## 2017-06-12 RX ADMIN — VANCOMYCIN HYDROCHLORIDE SCH MG: 1 INJECTION, POWDER, LYOPHILIZED, FOR SOLUTION INTRAVENOUS at 20:28

## 2017-06-12 RX ADMIN — Medication SCH MG: at 08:03

## 2017-06-12 RX ADMIN — VERAPAMIL HYDROCHLORIDE SCH MG: 120 TABLET, FILM COATED, EXTENDED RELEASE ORAL at 08:02

## 2017-06-12 RX ADMIN — SERTRALINE HYDROCHLORIDE SCH MG: 50 TABLET, FILM COATED ORAL at 20:31

## 2017-06-12 RX ADMIN — Medication SCH ML: at 16:54

## 2017-06-12 RX ADMIN — Medication SCH ML: at 20:28

## 2017-06-12 RX ADMIN — Medication SCH INTER.UNIT: at 08:04

## 2017-06-12 RX ADMIN — VANCOMYCIN HYDROCHLORIDE SCH MG: 1 INJECTION, POWDER, LYOPHILIZED, FOR SOLUTION INTRAVENOUS at 16:53

## 2017-06-12 RX ADMIN — LEVOTHYROXINE SODIUM SCH MCG: 75 TABLET ORAL at 06:35

## 2017-06-12 RX ADMIN — ACETAMINOPHEN PRN MG: 325 TABLET ORAL at 16:55

## 2017-06-12 RX ADMIN — HYDROCHLOROTHIAZIDE SCH MG: 25 TABLET ORAL at 08:03

## 2017-06-12 RX ADMIN — METOPROLOL TARTRATE SCH MG: 25 TABLET, FILM COATED ORAL at 08:03

## 2017-06-12 RX ADMIN — VANCOMYCIN HYDROCHLORIDE SCH MG: 1 INJECTION, POWDER, LYOPHILIZED, FOR SOLUTION INTRAVENOUS at 12:14

## 2017-06-12 RX ADMIN — ACETAMINOPHEN PRN MG: 325 TABLET ORAL at 11:03

## 2017-06-12 RX ADMIN — Medication SCH ML: at 12:14

## 2017-06-12 RX ADMIN — VANCOMYCIN HYDROCHLORIDE SCH MG: 1 INJECTION, POWDER, LYOPHILIZED, FOR SOLUTION INTRAVENOUS at 08:03

## 2017-06-12 RX ADMIN — CEFUROXIME AXETIL SCH MG: 500 TABLET ORAL at 08:02

## 2017-06-12 RX ADMIN — PANTOPRAZOLE SCH MG: 40 TABLET, DELAYED RELEASE ORAL at 08:03

## 2017-06-12 RX ADMIN — LISINOPRIL SCH MG: 20 TABLET ORAL at 20:29

## 2017-06-12 NOTE — INFECTIOUS DISEASE PROGRESS NT
Progress Note


Date of Service


Jun 12, 2017.





Subjective


Pt evaluation today including:  conversation w/ patient, physical exam, chart 

review, lab review, review of studies, conversation w/ consultant, review of 

inpatient medication list


Patient feeling better with less diarrhea.  No fever for 2 days.  No other new 

complaints.





   All Other Systems:  Reviewed and Negative





Medications





Current Inpatient Medications








 Medications


  (Trade)  Dose


 Ordered  Sig/Robinson


 Route  Start Time


 Stop Time Status Last Admin


Dose Admin


 


 Acetaminophen


  (Tylenol Tab)  650 mg  Q4H  PRN


 PO  6/5/17 18:00


 7/5/17 17:59  6/12/17 11:03


650 MG


 


 Al Hydrox/Mg


 Hydrox/Simethicone


  (Maalox Max Susp)  15 ml  Q4H  PRN


 PO  6/5/17 18:00


 7/5/17 17:59   


 


 


 Magnesium


 Hydroxide


  (Milk Of


 Magnesia Susp)  30 ml  Q6H  PRN


 PO  6/5/17 18:00


 7/5/17 17:59   


 


 


 Polyethylene


  (Miralax Powder


 Packet)  17 gm  DAILY  PRN


 PO  6/5/17 18:00


 7/5/17 17:59   


 


 


 Ondansetron HCl


  (Zofran Inj)  4 mg  Q6H  PRN


 IV  6/5/17 18:00


 7/5/17 17:59  6/8/17 06:38


4 MG


 


 Hydrochlorothiazide


  (Hydrochlorothiazide


 Tab)  25 mg  DAILY


 PO  6/6/17 08:00


 7/6/17 08:59  6/12/17 08:03


25 MG


 


 Levothyroxine


 Sodium


  (Synthroid Tab)  75 mcg  DAILYBB


 PO  6/6/17 06:30


 7/6/17 06:29  6/12/17 06:35


75 MCG


 


 Lisinopril


  (Zestril Tab)  20 mg  BID


 PO  6/5/17 20:00


 7/5/17 20:59  6/12/17 08:04


20 MG


 


 Sertraline HCl


  (Zoloft Tab)  25 mg  HS


 PO  6/5/17 21:00


 7/5/17 20:59  6/11/17 20:27


25 MG


 


 Verapamil HCl


  (Calan-Sr Tab)  120 mg  DAILY


 PO  6/6/17 08:00


 7/6/17 08:59  6/12/17 08:02


120 MG


 


 Pantoprazole


 Sodium


  (Protonix Tab)  40 mg  QAM


 PO  6/6/17 08:00


 7/6/17 08:59  6/12/17 08:03


40 MG


 


 Cholecalciferol


  (Vitamin D Tab)  5,000


 inter.unit  DAILY


 PO  6/6/17 08:00


 7/6/17 07:59  6/12/17 08:04


5,000 INTER.UNIT


 


 Hydralazine HCl


  (HydrALAZINE INJ)  20 mg  Q8  PRN


 IV.  6/6/17 08:00


 7/6/17 07:59   


 


 


 Prednisone


  (PredniSONE TAB)  10 mg  DAILY


 PO  6/7/17 08:00


 7/6/17 08:59  6/12/17 08:03


10 MG


 


 Magnesium Oxide


  (Mag-Ox Tab)  400 mg  BID


 PO  6/8/17 20:00


 7/8/17 19:59  6/12/17 08:03


400 MG


 


 Saccharomyces


 Boulardii


  (Florastor Cap)  250 mg  DAILY


 PO  6/8/17 12:30


 7/8/17 12:29  6/12/17 08:03


250 MG


 


 Metoprolol


 Tartrate


  (Lopressor Tab)  25 mg  BID


 PO  6/11/17 20:00


 7/11/17 09:29  6/12/17 08:03


25 MG


 


 Vancomycin HCl


  (Vancomycin Oral


 Soln)  125 mg  QID


 PO  6/11/17 14:30


 6/21/17 14:29  6/12/17 12:14


125 MG


 


 Raspberry


  (Raspberry Syrup


 5ml Cup)  5 ml  QID


 PO  6/11/17 14:30


 6/25/17 14:29  6/12/17 12:14


5 ML











Objective


Vital Signs











  Date Time  Temp Pulse Resp B/P (MAP) Pulse Ox O2 Delivery O2 Flow Rate FiO2


 


6/12/17 15:40 36.9 56 16 115/56 (75) 96 Room Air  


 


6/12/17 08:58      Room Air  


 


6/12/17 07:25 36.7 74 18 185/79 (114) 96 Room Air  


 


6/12/17 00:00      Room Air  


 


6/11/17 22:20 36.7 70 16 149/70 (96) 97 Room Air  


 


6/11/17 20:00      Room Air  


 


6/11/17 16:00      Room Air  


 


6/11/17 16:00 36.6 62 18 134/75 (94) 96 Room Air  











Physical Exam


General Appearance:  WD/WN, no apparent distress


Eyes:  normal inspection, sclerae normal


ENT:  normal ENT inspection, pharynx normal


Neck:  supple, no adenopathy, trachea midline


Respiratory/Chest:  chest non-tender, lungs clear, normal breath sounds, no 

respiratory distress


Cardiovascular:  regular rate, rhythm, no gallop, no murmur


Abdomen:  normal bowel sounds, non tender, soft, no organomegaly


Extremities:  non-tender, no calf tenderness


Neurologic/Psychiatric:  alert, oriented x 3


Skin:  normal color, no rash


Lymphatic:  no adenopathy





Laboratory Results





Last 24 Hours








Test


  6/12/17


07:05


 


White Blood Count 7.14 K/uL 


 


Red Blood Count 3.30 M/uL 


 


Hemoglobin 8.7 g/dL 


 


Hematocrit 27.5 % 


 


Mean Corpuscular Volume 83.3 fL 


 


Mean Corpuscular Hemoglobin 26.4 pg 


 


Mean Corpuscular Hemoglobin


Concent 31.6 g/dl 


 


 


Platelet Count 356 K/uL 


 


Mean Platelet Volume 9.8 fL 


 


Neutrophils (%) (Auto) 55.2 % 


 


Lymphocytes (%) (Auto) 31.8 % 


 


Monocytes (%) (Auto) 9.9 % 


 


Eosinophils (%) (Auto) 1.3 % 


 


Basophils (%) (Auto) 0.7 % 


 


Neutrophils # (Auto) 3.94 K/uL 


 


Lymphocytes # (Auto) 2.27 K/uL 


 


Monocytes # (Auto) 0.71 K/uL 


 


Eosinophils # (Auto) 0.09 K/uL 


 


Basophils # (Auto) 0.05 K/uL 


 


RDW Standard Deviation 45.9 fL 


 


RDW Coefficient of Variation 15.1 % 


 


Immature Granulocyte % (Auto) 1.1 % 


 


Immature Granulocyte # (Auto) 0.08 K/uL 


 


Red Blood Cell Morphology Unremarkable 


 


Sodium Level 137 mmol/L 


 


Potassium Level 3.8 mmol/L 


 


Chloride Level 101 mmol/L 


 


Carbon Dioxide Level 28 mmol/L 


 


Anion Gap 8.0 mmol/L 


 


Blood Urea Nitrogen 14 mg/dl 


 


Creatinine 0.93 mg/dl 


 


Est Creatinine Clear Calc


Drug Dose 45.1 ml/min 


 


 


Estimated GFR (


American) 67.3 


 


 


Estimated GFR (Non-


American 58.0 


 


 


BUN/Creatinine Ratio 14.5 


 


Random Glucose 87 mg/dl 


 


Calcium Level 8.9 mg/dl 


 


Magnesium Level 1.9 mg/dl 











Assessment and Plan


Clostridium difficile colitis in setting of treatment for E. coli UTI. 

Vancomycin appropriate therapy, would give prolonged tapering course given need 

for treatment of UTI with high risk of recurrent infection. Will discuss with 

all involved and follow.

## 2017-06-13 VITALS
OXYGEN SATURATION: 98 % | SYSTOLIC BLOOD PRESSURE: 162 MMHG | DIASTOLIC BLOOD PRESSURE: 73 MMHG | HEART RATE: 66 BPM | TEMPERATURE: 98.06 F

## 2017-06-13 VITALS
TEMPERATURE: 98.06 F | DIASTOLIC BLOOD PRESSURE: 106 MMHG | SYSTOLIC BLOOD PRESSURE: 157 MMHG | HEART RATE: 66 BPM | OXYGEN SATURATION: 98 %

## 2017-06-13 VITALS
TEMPERATURE: 98.06 F | DIASTOLIC BLOOD PRESSURE: 73 MMHG | OXYGEN SATURATION: 98 % | HEART RATE: 66 BPM | SYSTOLIC BLOOD PRESSURE: 162 MMHG

## 2017-06-13 VITALS — OXYGEN SATURATION: 97 % | SYSTOLIC BLOOD PRESSURE: 105 MMHG | HEART RATE: 81 BPM | DIASTOLIC BLOOD PRESSURE: 64 MMHG

## 2017-06-13 RX ADMIN — HYDROCHLOROTHIAZIDE SCH MG: 25 TABLET ORAL at 08:50

## 2017-06-13 RX ADMIN — Medication SCH INTER.UNIT: at 08:51

## 2017-06-13 RX ADMIN — LEVOTHYROXINE SODIUM SCH MCG: 75 TABLET ORAL at 05:37

## 2017-06-13 RX ADMIN — Medication SCH MG: at 08:50

## 2017-06-13 RX ADMIN — METOPROLOL TARTRATE SCH MG: 25 TABLET, FILM COATED ORAL at 08:50

## 2017-06-13 RX ADMIN — VANCOMYCIN HYDROCHLORIDE SCH MG: 1 INJECTION, POWDER, LYOPHILIZED, FOR SOLUTION INTRAVENOUS at 08:51

## 2017-06-13 RX ADMIN — Medication SCH ML: at 08:50

## 2017-06-13 RX ADMIN — LISINOPRIL SCH MG: 20 TABLET ORAL at 08:51

## 2017-06-13 RX ADMIN — PANTOPRAZOLE SCH MG: 40 TABLET, DELAYED RELEASE ORAL at 08:50

## 2017-06-13 RX ADMIN — Medication SCH ML: at 12:10

## 2017-06-13 RX ADMIN — VERAPAMIL HYDROCHLORIDE SCH MG: 120 TABLET, FILM COATED, EXTENDED RELEASE ORAL at 08:49

## 2017-06-13 RX ADMIN — ACETAMINOPHEN PRN MG: 325 TABLET ORAL at 08:54

## 2017-06-13 RX ADMIN — VANCOMYCIN HYDROCHLORIDE SCH MG: 1 INJECTION, POWDER, LYOPHILIZED, FOR SOLUTION INTRAVENOUS at 12:10

## 2017-06-13 NOTE — DISCHARGE SUMMARY
Discharge Summary


Date of Service


Jun 13, 2017.





Discharge Summary


Admission Date:


Jun 5, 2017 at 18:07


Discharge Date:  Mark 10, 2017


Discharge Disposition:  Skilled nursing facility


Principal Diagnosis:  C diff infection, UTI


Problems/Secondary Diagnoses:


(1) Anemia


Status: Chronic  





(2) Diverticulosis Colon (W/O Ment Of Hemorrhage)


Status: Chronic  





(3) DJD (degenerative joint disease)


Status: Chronic  





(4) Esophageal Reflux


Status: Chronic  





(5) Hypertension


Status: Chronic  





(6) Hypothyroidism, Unspecified


Status: Chronic  





(7) Irritable Bowel Syndrome


Status: Chronic  





(8) Long-Term(Current)Use Of Steroids


Status: Chronic  





(9) Polymyalgia Rheumatica


Status: Chronic  





(10) Rheumatoid Arthritis, Unspecified


Status: Chronic  





(11) Unspecified Urinary Incontinence


Status: Chronic  





(12) Vitamin D Deficiency, Unspecified


Status: Chronic  








Immunizations:  


   Have You Had Influenza Vaccine:  Yes


   History of Tetanus Vaccine?:  Yes


   Tetanus Immunization Date:  Jul 9, 2008


   History of Pneumococcal:  Yes


   Pneumococcal Date:  Jun 9, 2004


   History of Hepatitis B Vaccine:  No





Medication Reconciliation


New Medications:  


Vancomycin Hcl (Vancomycin) 125 Mg Cap


125 MG PO QID for 9 Days, #36 TABS





Magnesium Oxide (Magnesium-Oxide) 400 Mg Tab


400 MG PO BID for 5 Days, TAB





Metoprolol Tartrate (Lopressor) 25 Mg Tab


25 MG PO BID for 30 Days, #60 TAB





Prednisone (Prednisone) 10 Mg Tab


10 MG PO DAILY for 30 Days, TAB





Saccharomyces Boulardii (Florastor) 250 Mg Cap


250 MG PO DAILY for 7 Days, CAP





 


Continued Medications:  


Cholecalciferol (Vitamin D) 5,000 Unit Tab


5000 UNITS PO DAILY





Hydrochlorothiazide (Hydrochlorothiazide) 25 Mg Tab


25 MG PO DAILY, #90





Lansoprazole (Prevacid) 30 Mg Cap


30 MG PO DAILY, #90





Levothyroxine Sodium (Synthroid) 75 Mcg Tab


75 MCG PO DAILY, TAB





Lisinopril (Prinivil) 20 Mg Tab


20 MG PO BID, TAB





Melatonin (Kp Melatonin) 3 Mg Tab


6 MG PO HS for 30 Days, #30 TAB





Sertraline (Zoloft) 25 Mg Tab


25 MG PO HS, TAB





Verapamil Hcl (Verapamil Hcl Er) 120 Mg Tab


120 MG PO DAILY, #90





 


Discontinued Medications:  


Hydroxychloroquine Sulfate (Hydroxychloroquine Sulfat) 200 Mg Tab


200 MG PO BID





Prednisone (Prednisone) 1 Mg Tab


3 MG PO QAM, TAB





Prednisone (Prednisone) 5 Mg Tab


5 MG PO DAILY, TAB











Discharge Exam


Review of Systems:  


   Constitutional:  No fever, No chills, No sweats, No weakness, No fatigue


   ENT:  No hearing loss, No unusual epistaxis, No nasal symptoms, No sore 

throat, No tinnitus


   Respiratory:  No cough, No sputum, No wheezing, No shortness of breath, No 

dyspnea on exertion


   Cardiovascular:  No chest pain, No orthopnea, No PND, No edema


   Abdomen:  No pain, No nausea, No vomiting, No diarrhea, No constipation


   Musculoskeletal:  No joint pain, No muscle pain, No swelling


   Genitourinary - Female:  No dysuria, No urinary frequency, No urinary urgency

, No urinary incontinence


   Neurologic:  No memory loss, No paralysis, No weakness, No numbness/tingling


   Psychiatric:  No depression symptoms, No anhedonism, No anxiety, No insomnia


   Integumentary:  No rash, No itch


Physical Exam:  


   General Appearance:  WD/WN, no apparent distress


   Eyes:  normal inspection, PERRL, EOMI, sclerae normal


   Neck:  supple, no adenopathy, thyroid normal, no JVD


   Respiratory/Chest:  chest non-tender, lungs clear, normal breath sounds, no 

respiratory distress


   Cardiovascular:  regular rate, rhythm, no edema, no gallop, no JVD


   Abdomen / GI:  normal bowel sounds, non tender, soft, no organomegaly


   Extremities:  normal inspection, no calf tenderness, normal capillary refill

, no pedal edema


   Neurologic/Psychiatric:  alert, normal mood/affect, normal reflexes, 

oriented x 3


   Skin:  normal color, warm/dry, no rash





Hospital Course


80 y/o female admitted on 6/5/2017 with nausea, vomiting, diarrhea, and weakness





New onset C. difficile diarrhea, improved


Improved now on vancomycin, will discharge home on vancomycin 125 mg PO QID x 9 

days 


in addition to florastor


Will need prolonged tx per ID recs


Failed flagyl as pt states worsening diarrhea


Patient has underlined possible immunodeficiency secondary to taking oral 

prednisone for rheumatoid disease





Escherichia coli UTI: Urine culture is back positive with Escherichia coli and 

a second gram-negative margie.  


No symptoms


Finished course of ceftin


Afebrile





Heme positive stool likely from C dfiff infection


GI consulted


Hg stable, no further intervention


Rec continued antibx course 10 days after last dose of ceftin





Diarrhea and poor by mouth intake, possible dehydration, was on IV fluid, 

resolved





History of PMR, RS3PE syndrome, inflammatory symmetrical polyarthritis, 

hypertension, hypothyroidism, irritable bowel syndrome, and chronic reflux 

esophagitis who presented to the ED 





Hyponatremia resolved with adequate PO intake





RS3PE/inflammatory symmetrical polyarthritis--stable


Cont to hold hydroxychloroquine, discussed with Dr. leon, recommend continue 

hold hydroxychloroquine, and follow-up with her as outpatient 2 week





HTN--not well controlled, continue lisinopril 20 mg PO BID, hydrochlorothiazide 

25 mg PO qd, and verapamil 120 mg PO qd, metoprolol 12.5 mg by mouth twice a 

day yesterday, we'll increase to 25 mg by mouth twice a day





Hypothyroidism--stable, continue Synthroid 75 g PO qd





Depression--stable, continue Zoloft 25 mg PO qhs





DVT prophylaxis with heparin 5000 units SC q12h


-PEARL hose and SCDs





Code Status


DO NOT RESUSCITATE


Total Time Spent:  Greater than 30 minutes


This includes examination of the patient, discharge planning, medication 

reconciliation, and communication with other providers.





Discharge Instructions


Please refer to the electronic Patient Visit Report (Discharge Instructions) 

for additional information.

## 2017-06-13 NOTE — DISCHARGE INSTRUCTIONS
Discharge Instructions


Date of Service


Jun 13, 2017.





Admission


Reason for Admission:  Nausea & Vomiting,Urinary Tract Infection,Weakness





Discharge


Discharge Diagnosis / Problem:  Urinary tract infection, c diff infection





Discharge Goals


Goal(s):  Decrease discomfort, Improve function, Increase independence, Improve 

disease control, Learn about illness, Diagnostic testing, Therapeutic 

intervention, Prevent Disease Progression





Activity Recommendations


Activity Limitations:  resume your previous activity


Shower/Bathe:  no limitations


Patient to be discharged to [facility]


Please continue to take antibiotic vancomycin 125 mg tablet 4 times a day for 9 

more days


Please note increase in blood pressure medication metoprolol to 25 mg twice a 

day


If worsening diarrhea, fevers or abdominal pain please report to ER


Please follow up with primary care provider in 1-2 weeks





.





Current Hospital Diet


Patient's current hospital diet: AHA Diet (Heart Healthy)





Discharge Diet


Recommended Diet:  AHA Diet (Heart Healthy)





Procedures


Procedures Performed:  


no





Pending Studies


Studies pending at discharge:  no





Medical Emergencies








.


Who to Call and When:





Medical Emergencies:  If at any time you feel your situation is an emergency, 

please call 911 immediately.





.





Non-Emergent Contact


Non-Emergency issues call your:  Primary Care Provider


Call Non-Emergent contact if:  you have a fever, your pain is worsening





.


.








"Provider Documentation" section prepared by Antony Schultz.








.





VTE Core Measure


Inpt VTE Proph given/why not?:  Unfractionated heparin SQ, T.E.D. Stockings, SCD

's

## 2017-06-29 ENCOUNTER — HOSPITAL ENCOUNTER (INPATIENT)
Dept: HOSPITAL 45 - C.EDC | Age: 82
LOS: 4 days | Discharge: HOME HEALTH | DRG: 641 | End: 2017-07-03
Attending: HOSPITALIST | Admitting: HOSPITALIST
Payer: COMMERCIAL

## 2017-06-29 VITALS
OXYGEN SATURATION: 98 % | HEART RATE: 68 BPM | TEMPERATURE: 97.88 F | SYSTOLIC BLOOD PRESSURE: 153 MMHG | DIASTOLIC BLOOD PRESSURE: 67 MMHG

## 2017-06-29 VITALS
HEIGHT: 60 IN | WEIGHT: 156.31 LBS | BODY MASS INDEX: 30.69 KG/M2 | HEIGHT: 60 IN | BODY MASS INDEX: 30.69 KG/M2 | WEIGHT: 156.31 LBS

## 2017-06-29 DIAGNOSIS — E87.1: Primary | ICD-10-CM

## 2017-06-29 DIAGNOSIS — M06.9: ICD-10-CM

## 2017-06-29 DIAGNOSIS — F32.9: ICD-10-CM

## 2017-06-29 DIAGNOSIS — Z66: ICD-10-CM

## 2017-06-29 DIAGNOSIS — K58.0: ICD-10-CM

## 2017-06-29 DIAGNOSIS — Z96.659: ICD-10-CM

## 2017-06-29 DIAGNOSIS — M19.90: ICD-10-CM

## 2017-06-29 DIAGNOSIS — K57.30: ICD-10-CM

## 2017-06-29 DIAGNOSIS — K21.9: ICD-10-CM

## 2017-06-29 DIAGNOSIS — N39.0: ICD-10-CM

## 2017-06-29 DIAGNOSIS — Z88.2: ICD-10-CM

## 2017-06-29 DIAGNOSIS — M35.3: ICD-10-CM

## 2017-06-29 DIAGNOSIS — H26.9: ICD-10-CM

## 2017-06-29 DIAGNOSIS — E55.9: ICD-10-CM

## 2017-06-29 DIAGNOSIS — Z88.4: ICD-10-CM

## 2017-06-29 DIAGNOSIS — E03.9: ICD-10-CM

## 2017-06-29 DIAGNOSIS — Z82.49: ICD-10-CM

## 2017-06-29 DIAGNOSIS — Z79.52: ICD-10-CM

## 2017-06-29 DIAGNOSIS — I10: ICD-10-CM

## 2017-06-29 DIAGNOSIS — A04.7: ICD-10-CM

## 2017-06-29 DIAGNOSIS — Z79.899: ICD-10-CM

## 2017-06-29 DIAGNOSIS — Z90.710: ICD-10-CM

## 2017-06-29 LAB
ALP SERPL-CCNC: 56 U/L (ref 45–117)
ALT SERPL-CCNC: 18 U/L (ref 12–78)
ANION GAP SERPL CALC-SCNC: 12 MMOL/L (ref 3–11)
ANION GAP SERPL CALC-SCNC: 8 MMOL/L (ref 3–11)
AST SERPL-CCNC: 10 U/L (ref 15–37)
BASOPHILS # BLD: 0.03 K/UL (ref 0–0.2)
BASOPHILS NFR BLD: 0.2 %
BUN SERPL-MCNC: 14 MG/DL (ref 7–18)
BUN SERPL-MCNC: 17 MG/DL (ref 7–18)
BUN/CREAT SERPL: 14.1 (ref 10–20)
BUN/CREAT SERPL: 15 (ref 10–20)
CALCIUM SERPL-MCNC: 8.8 MG/DL (ref 8.5–10.1)
CALCIUM SERPL-MCNC: 9.9 MG/DL (ref 8.5–10.1)
CHLORIDE SERPL-SCNC: 78 MMOL/L (ref 98–107)
CHLORIDE SERPL-SCNC: 84 MMOL/L (ref 98–107)
CO2 SERPL-SCNC: 25 MMOL/L (ref 21–32)
CO2 SERPL-SCNC: 26 MMOL/L (ref 21–32)
COMPLETE: YES
CREAT CL PREDICTED SERPL C-G-VRATE: 35.8 ML/MIN
CREAT CL PREDICTED SERPL C-G-VRATE: 39.4 ML/MIN
CREAT SERPL-MCNC: 1 MG/DL (ref 0.6–1.2)
CREAT SERPL-MCNC: 1.1 MG/DL (ref 0.6–1.2)
EOSINOPHIL NFR BLD AUTO: 401 K/UL (ref 130–400)
GLUCOSE SERPL-MCNC: 117 MG/DL (ref 70–99)
GLUCOSE SERPL-MCNC: 93 MG/DL (ref 70–99)
HCT VFR BLD CALC: 29.7 % (ref 37–47)
IG%: 0.7 %
IMM GRANULOCYTES NFR BLD AUTO: 10.9 %
LYMPHOCYTES # BLD: 1.41 K/UL (ref 1.2–3.4)
MAGNESIUM SERPL-MCNC: 1.9 MG/DL (ref 1.8–2.4)
MCH RBC QN AUTO: 25.3 PG (ref 25–34)
MCHC RBC AUTO-ENTMCNC: 32.7 G/DL (ref 32–36)
MCV RBC AUTO: 77.5 FL (ref 80–100)
MONOCYTES NFR BLD: 4.6 %
NEUTROPHILS # BLD AUTO: 0.5 %
NEUTROPHILS NFR BLD AUTO: 83.1 %
PMV BLD AUTO: 9.3 FL (ref 7.4–10.4)
POTASSIUM SERPL-SCNC: 3.8 MMOL/L (ref 3.5–5.1)
POTASSIUM SERPL-SCNC: 4.3 MMOL/L (ref 3.5–5.1)
RBC # BLD AUTO: 3.83 M/UL (ref 4.2–5.4)
SODIUM SERPL-SCNC: 115 MMOL/L (ref 136–145)
SODIUM SERPL-SCNC: 118 MMOL/L (ref 136–145)
TSH SERPL-ACNC: 2.92 UIU/ML (ref 0.3–4.5)
WBC # BLD AUTO: 12.93 K/UL (ref 4.8–10.8)

## 2017-06-29 NOTE — HISTORY AND PHYSICAL
History & Physical


Date & Time of Service:


Jun 29, 2017 at 21:35


Chief Complaint:


Abnormal Labs


Primary Care Physician:


García Thomas D.O.


History of Present Illness


Source:  patient, family


81 year old female transferred from [facility] with extreme fatigue. 





Was discharged from the hospital two weeks ago after being treated for cdiff 

and uti. She was sent to [facility] for rehabilitation. She was discharged on 

vancomycin. At [facility] she continued her vancomycin and she got a UTI while 

there so was therefore treated with ciprofloxacin. 





Starting 2-3 days ago at [facility] she started feeling very tired. She 

has been drinking 3-4 cups of large water. She has been urinating 3 times over 

the past 24 hours. She has been feeling thirsty. She vomited twice today. Her 

bowel movements are now semiformed.





Past Medical/Surgical History


Medical Problems:


(1) Acute kidney injury


Status: Resolved  





(2) Altered mental status


Status: Resolved  





(3) Anemia


Status: Chronic  





(4) Dehydration


Status: Resolved  





(5) Diarrhea


Status: Resolved  





(6) Diverticulosis Colon (W/O Ment Of Hemorrhage)


Status: Chronic  





(7) Dizziness


Status: Resolved  





(8) DJD (degenerative joint disease)


Status: Chronic  





(9) Esophageal Reflux


Status: Chronic  





(10) Headache


Status: Resolved  





(11) Hypertension


Status: Chronic  





(12) Hypokalemia


Status: Resolved  





(13) Hypomagnesemia


Status: Resolved  





(14) Hyponatremia


Status: Resolved  





(15) Hypothyroidism, Unspecified


Status: Chronic  





(16) Irritable Bowel Syndrome


Status: Chronic  





(17) Long-Term(Current)Use Of Steroids


Status: Chronic  





(18) Medication reaction


Status: Resolved  





(19) Polymyalgia Rheumatica


Status: Chronic  





(20) Renal insufficiency


Status: Resolved  





(21) Rheumatoid Arthritis, Unspecified


Status: Chronic  





(22) SIRS (systemic inflammatory response syndrome)


Status: Resolved  





(23) Syncopal episodes


Status: Resolved  





(24) Syncope


Status: Resolved  





(25) Thoracic radiculopathy


Status: Resolved  





(26) Unspecified Urinary Incontinence


Status: Chronic  





(27) Vitamin D Deficiency, Unspecified


Status: Chronic  





Surgical Problems:


(1) History of hysterectomy


Status: Resolved  





(2) Knee Joint Replacement Status


Status: Resolved  











Family History





Heart disease


Hypertension





Social History


Smoking Status:  Never Smoker


Alcohol Use:  none


Drug Use:  none


Marital Status:  


Housing status:  lives with significant other


Occupational Status:  retired





Immunizations


History of Influenza Vaccine:  Yes


History of Tetanus Vaccine?:  Yes


Tetanus Immunization Date:  Jul 9, 2008


History of Pneumococcal:  Yes


Pneumococcal Date:  Jun 9, 2004


History of Hepatitis B Vaccine:  No





Multi-Drug Resistant Organisms


History of MDRO:  No





Allergies


Coded Allergies:  


     Sulfa Antibiotics (Verified  Allergy, Intermediate, RASH, 6/29/17)


     Erythromycin (Verified  Adverse Reaction, Unknown, N&V, 6/29/17)


 N&V





Home Medications


Scheduled


Acetaminophen (Tylenol Arthritis Ext Rel), 650 MG PO TID


Cholecalciferol (Vitamin D), 5,000 UNITS PO DAILY


Ciprofloxacin (Cipro), 250 MG PO BID


Hydrochlorothiazide (Hydrochlorothiazide), 25 MG PO DAILY


Lansoprazole (Prevacid), 30 MG PO DAILY


Levothyroxine Sodium (Synthroid), 75 MCG PO DAILY


Lisinopril (Prinivil), 20 MG PO BID


Melatonin (Kp Melatonin), 6 MG PO HS


Metoprolol Tartrate (Lopressor) (Lopressor), 25 MG PO BID


Prednisone (Prednisone), 10 MG PO QAM


Ranitidine Hcl (Zantac), 150 MG PO BID17


Saccharomyces Boulardii (Florastor), 250 MG PO BID


Sertraline (Zoloft), 25 MG PO HS


Vancomycin Hcl (Vancomycin), 250 MG PO QID


Verapamil Hcl (Verapamil Hcl Er), 120 MG PO DAILY





Scheduled PRN


Acetaminophen Tab (Tylenol), 650 MG PO Q4H PRN for Pain or Fever


Calcium Carbonate (Tums), 1 TAB PO Q4H PRN for Indigestion


Ondansetron Hcl (Zofran), 4 MG PO Q6H PRN for Nausea





Review of Systems


Constitutional:  No fever, No chills, No sweats


Respiratory:  + cough, No sputum, No shortness of breath


Cardiovascular:  No chest pain, No claudication, No palpitations


Abdomen:  No pain, No nausea, No vomiting


Musculoskeletal:  + joint pain, No muscle pain, No swelling


Genitourinary - Female:  No dysuria, No urinary frequency


Neurologic:  + paralysis, + weakness


Endocrine:  + fatigue





Physical Exam


Vital Signs











  Date Time  Temp Pulse Resp B/P (MAP) Pulse Ox O2 Delivery O2 Flow Rate FiO2


 


6/29/17 21:30  78 20 159/90 97 Room Air  


 


6/29/17 20:48  84 20 176/81 98   


 


6/29/17 19:05  62 20 165/72 95   


 


6/29/17 19:04  66      


 


6/29/17 18:44     98 Room Air  


 


6/29/17 18:39 36.7 72 20 199/61 98 Room Air  








General Appearance:  WD/WN, no apparent distress


Head:  normocephalic, atraumatic


ENT:  hearing grossly normal, pharynx normal


Neck:  supple, no adenopathy, no JVD, no carotid bruits, trachea midline


Respiratory/Chest:  chest non-tender, lungs clear, no respiratory distress, no 

accessory muscle use


Cardiovascular:  regular rate, rhythm, no JVD, no murmur, normal peripheral 

pulses


Abdomen/GI:  normal bowel sounds, soft


Extremities/Musculoskelatal:  normal inspection, no calf tenderness, no pedal 

edema, normal range of motion, non-tender, + pertinent finding (painful joints 

in the hands bilaterally, non erythematous and non swollen)


Neurologic/Psych:  alert, normal mood/affect, oriented x 3


Skin:  normal color, warm/dry, no rash





Diagnostics


Laboratory Results





Results Past 24 Hours








Test


  6/29/17


18:15 Range/Units


 


 


White Blood Count 12.93 4.8-10.8  K/uL


 


Red Blood Count 3.83 4.2-5.4  M/uL


 


Hemoglobin 9.7 12.0-16.0  g/dL


 


Hematocrit 29.7 37-47  %


 


Mean Corpuscular Volume 77.5   fL


 


Mean Corpuscular Hemoglobin 25.3 25-34  pg


 


Mean Corpuscular Hemoglobin


Concent 32.7


  32-36  g/dl


 


 


Platelet Count 401 130-400  K/uL


 


Mean Platelet Volume 9.3 7.4-10.4  fL


 


Neutrophils (%) (Auto) 83.1  %


 


Lymphocytes (%) (Auto) 10.9  %


 


Monocytes (%) (Auto) 4.6  %


 


Eosinophils (%) (Auto) 0.5  %


 


Basophils (%) (Auto) 0.2  %


 


Neutrophils # (Auto) 10.74 1.4-6.5  K/uL


 


Lymphocytes # (Auto) 1.41 1.2-3.4  K/uL


 


Monocytes # (Auto) 0.59 0.11-0.59  K/uL


 


Eosinophils # (Auto) 0.07 0-0.5  K/uL


 


Basophils # (Auto) 0.03 0-0.2  K/uL


 


RDW Standard Deviation 41.9 36.4-46.3  fL


 


RDW Coefficient of Variation 14.7 11.5-14.5  %


 


Immature Granulocyte % (Auto) 0.7  %


 


Immature Granulocyte # (Auto) 0.09 0.00-0.02  K/uL


 


Sodium Level 115 136-145  mmol/L


 


Potassium Level 4.3 3.5-5.1  mmol/L


 


Chloride Level 78   mmol/L


 


Carbon Dioxide Level 25 21-32  mmol/L


 


Anion Gap 12.0 3-11  mmol/L


 


Blood Urea Nitrogen 17 7-18  mg/dl


 


Creatinine


  1.10


  0.60-1.20


mg/dl


 


Est Creatinine Clear Calc


Drug Dose 35.8


   ml/min


 


 


Estimated GFR (


American) 54.9


   


 


 


Estimated GFR (Non-


American 47.4


   


 


 


BUN/Creatinine Ratio 15.0 10-20  


 


Random Glucose 117 70-99  mg/dl


 


Calcium Level 9.9 8.5-10.1  mg/dl


 


Magnesium Level 1.9 1.8-2.4  mg/dl


 


Total Bilirubin 0.3 0.2-1  mg/dl


 


Direct Bilirubin < 0.1 0-0.2  mg/dl


 


Aspartate Amino Transf


(AST/SGOT) 10


  15-37  U/L


 


 


Alanine Aminotransferase


(ALT/SGPT) 18


  12-78  U/L


 


 


Alkaline Phosphatase 56   U/L


 


Troponin I < 0.015 0-0.045  ng/ml


 


Total Protein 7.4 6.4-8.2  gm/dl


 


Albumin 3.5 3.4-5.0  gm/dl


 


Lipase 139   U/L


 


Thyroid Stimulating Hormone


(TSH) 2.920


  0.300-4.500


uIu/ml











EKG


Sinus rythm with PVCs





Impression


Assessment and Plan


81 year old female currently being treated at [facility] for c.diff and 

uti brought to the ED for severe fatigue and hyponatremia 





Hyponatremia


- sodium in the ED was 115


- awaiting serum osm and urine osm as ED did not order these


- will give stress dose steroids of 100mg hydrocortisone q8


- check sodium q6


- continue 125 ns


- Dr. Szymanski will decide on further treatment after receiving serum osm 

and urine osm





UTI


- obtain UA


- continue cipro until July1st to finish full course





C.Diff


- continue vanc PO until June 30th to finish full course


- can get repeat c.diff to ensure eradication





HTN


- continue hctz, lisinopril, metoprolol and verapamil





Hypothyroidism


- continue synthroid





RS3PE/Inflammatory Polyarthritis


- currently not on any medication as hydroxycholoroquine caused the initial 

diarrhea and c.diff


- follows with Dr. Coppola





Depression


- continue sertraline





GERD


- continue prevacid and zantac





PMR


- hold prednisone. 10mg daily is home dose





Diet


- regular





DVT prophylaxis


- SCD's and TEDS





Code


- DNR





Level of Care


Med/Surg





Resuscitation Status


DO NOT RESUSCITATE





VTE Prophylaxis


VTE Risk Assessment Done? Y/N:  Yes


Risk Level:  Moderate


Given or contraindicated:  OMARI Stockings, SCD's





Assessment and Plan


Attending Addendum:











I have physically seen and examined this patient, have directed their medical 

care, have supervised the medical residents activities, and agree with the H&P 

as noted above, with the following changes: NONE

## 2017-06-30 VITALS
OXYGEN SATURATION: 95 % | TEMPERATURE: 98.24 F | HEART RATE: 74 BPM | DIASTOLIC BLOOD PRESSURE: 71 MMHG | SYSTOLIC BLOOD PRESSURE: 128 MMHG

## 2017-06-30 VITALS
DIASTOLIC BLOOD PRESSURE: 70 MMHG | TEMPERATURE: 98.42 F | HEART RATE: 65 BPM | SYSTOLIC BLOOD PRESSURE: 119 MMHG | OXYGEN SATURATION: 96 %

## 2017-06-30 VITALS
OXYGEN SATURATION: 95 % | DIASTOLIC BLOOD PRESSURE: 77 MMHG | HEART RATE: 62 BPM | SYSTOLIC BLOOD PRESSURE: 121 MMHG | TEMPERATURE: 97.88 F

## 2017-06-30 VITALS — OXYGEN SATURATION: 98 %

## 2017-06-30 VITALS — OXYGEN SATURATION: 95 %

## 2017-06-30 LAB
ANION GAP SERPL CALC-SCNC: 10 MMOL/L (ref 3–11)
ANION GAP SERPL CALC-SCNC: 10 MMOL/L (ref 3–11)
ANION GAP SERPL CALC-SCNC: 8 MMOL/L (ref 3–11)
ANION GAP SERPL CALC-SCNC: 8 MMOL/L (ref 3–11)
BUN SERPL-MCNC: 13 MG/DL (ref 7–18)
BUN SERPL-MCNC: 13 MG/DL (ref 7–18)
BUN SERPL-MCNC: 14 MG/DL (ref 7–18)
BUN SERPL-MCNC: 18 MG/DL (ref 7–18)
BUN/CREAT SERPL: 11.4 (ref 10–20)
BUN/CREAT SERPL: 13 (ref 10–20)
BUN/CREAT SERPL: 13.2 (ref 10–20)
BUN/CREAT SERPL: 14 (ref 10–20)
CALCIUM SERPL-MCNC: 8.7 MG/DL (ref 8.5–10.1)
CALCIUM SERPL-MCNC: 9 MG/DL (ref 8.5–10.1)
CALCIUM SERPL-MCNC: 9.2 MG/DL (ref 8.5–10.1)
CALCIUM SERPL-MCNC: 9.6 MG/DL (ref 8.5–10.1)
CHLORIDE SERPL-SCNC: 86 MMOL/L (ref 98–107)
CHLORIDE SERPL-SCNC: 86 MMOL/L (ref 98–107)
CHLORIDE SERPL-SCNC: 87 MMOL/L (ref 98–107)
CHLORIDE SERPL-SCNC: 88 MMOL/L (ref 98–107)
CO2 SERPL-SCNC: 25 MMOL/L (ref 21–32)
CO2 SERPL-SCNC: 26 MMOL/L (ref 21–32)
CO2 SERPL-SCNC: 26 MMOL/L (ref 21–32)
CO2 SERPL-SCNC: 27 MMOL/L (ref 21–32)
CREAT CL PREDICTED SERPL C-G-VRATE: 30.3 ML/MIN
CREAT CL PREDICTED SERPL C-G-VRATE: 32.9 ML/MIN
CREAT CL PREDICTED SERPL C-G-VRATE: 40.6 ML/MIN
CREAT CL PREDICTED SERPL C-G-VRATE: 40.6 ML/MIN
CREAT SERPL-MCNC: 0.97 MG/DL (ref 0.6–1.2)
CREAT SERPL-MCNC: 0.97 MG/DL (ref 0.6–1.2)
CREAT SERPL-MCNC: 1.2 MG/DL (ref 0.6–1.2)
CREAT SERPL-MCNC: 1.3 MG/DL (ref 0.6–1.2)
GLUCOSE SERPL-MCNC: 103 MG/DL (ref 70–99)
GLUCOSE SERPL-MCNC: 111 MG/DL (ref 70–99)
GLUCOSE SERPL-MCNC: 115 MG/DL (ref 70–99)
GLUCOSE SERPL-MCNC: 157 MG/DL (ref 70–99)
POTASSIUM SERPL-SCNC: 3.7 MMOL/L (ref 3.5–5.1)
POTASSIUM SERPL-SCNC: 3.8 MMOL/L (ref 3.5–5.1)
POTASSIUM SERPL-SCNC: 3.9 MMOL/L (ref 3.5–5.1)
POTASSIUM SERPL-SCNC: 4 MMOL/L (ref 3.5–5.1)
SODIUM SERPL-SCNC: 121 MMOL/L (ref 136–145)
SODIUM SERPL-SCNC: 122 MMOL/L (ref 136–145)

## 2017-06-30 RX ADMIN — PANTOPRAZOLE SCH MG: 40 TABLET, DELAYED RELEASE ORAL at 07:52

## 2017-06-30 RX ADMIN — VANCOMYCIN HYDROCHLORIDE SCH MG: 1 INJECTION, POWDER, LYOPHILIZED, FOR SOLUTION INTRAVENOUS at 07:52

## 2017-06-30 RX ADMIN — LISINOPRIL SCH MG: 20 TABLET ORAL at 07:53

## 2017-06-30 RX ADMIN — CIPROFLOXACIN HYDROCHLORIDE SCH MG: 250 TABLET, FILM COATED ORAL at 20:47

## 2017-06-30 RX ADMIN — HYDROCORTISONE SODIUM SUCCINATE SCH MLS/MIN: 100 INJECTION, POWDER, FOR SOLUTION INTRAMUSCULAR; INTRAVENOUS at 05:55

## 2017-06-30 RX ADMIN — LEVOTHYROXINE SODIUM SCH MCG: 75 TABLET ORAL at 05:56

## 2017-06-30 RX ADMIN — HYDROCORTISONE SODIUM SUCCINATE SCH MLS/MIN: 100 INJECTION, POWDER, FOR SOLUTION INTRAMUSCULAR; INTRAVENOUS at 13:54

## 2017-06-30 RX ADMIN — GUAIFENESIN AND CODEINE PHOSPHATE PRN ML: 10; 100 LIQUID ORAL at 15:31

## 2017-06-30 RX ADMIN — Medication SCH ML: at 07:52

## 2017-06-30 RX ADMIN — Medication SCH ML: at 13:08

## 2017-06-30 RX ADMIN — VANCOMYCIN HYDROCHLORIDE SCH MG: 1 INJECTION, POWDER, LYOPHILIZED, FOR SOLUTION INTRAVENOUS at 17:07

## 2017-06-30 RX ADMIN — LISINOPRIL SCH MG: 20 TABLET ORAL at 20:49

## 2017-06-30 RX ADMIN — Medication SCH ML: at 00:08

## 2017-06-30 RX ADMIN — Medication SCH MG: at 07:52

## 2017-06-30 RX ADMIN — METOPROLOL TARTRATE SCH MG: 25 TABLET, FILM COATED ORAL at 20:49

## 2017-06-30 RX ADMIN — VANCOMYCIN HYDROCHLORIDE SCH MG: 1 INJECTION, POWDER, LYOPHILIZED, FOR SOLUTION INTRAVENOUS at 00:08

## 2017-06-30 RX ADMIN — HYDROCORTISONE SODIUM SUCCINATE SCH MLS/MIN: 100 INJECTION, POWDER, FOR SOLUTION INTRAMUSCULAR; INTRAVENOUS at 00:09

## 2017-06-30 RX ADMIN — Medication SCH ML: at 17:07

## 2017-06-30 RX ADMIN — VERAPAMIL HYDROCHLORIDE SCH MG: 120 TABLET, FILM COATED, EXTENDED RELEASE ORAL at 07:53

## 2017-06-30 RX ADMIN — SERTRALINE HYDROCHLORIDE SCH MG: 50 TABLET, FILM COATED ORAL at 20:50

## 2017-06-30 RX ADMIN — ACETAMINOPHEN SCH MG: 325 TABLET ORAL at 13:55

## 2017-06-30 RX ADMIN — ACETAMINOPHEN SCH MG: 325 TABLET ORAL at 20:51

## 2017-06-30 RX ADMIN — VANCOMYCIN HYDROCHLORIDE SCH MG: 1 INJECTION, POWDER, LYOPHILIZED, FOR SOLUTION INTRAVENOUS at 13:09

## 2017-06-30 RX ADMIN — Medication SCH INTER.UNIT: at 07:53

## 2017-06-30 RX ADMIN — ACETAMINOPHEN SCH MG: 325 TABLET ORAL at 07:53

## 2017-06-30 RX ADMIN — RANITIDINE SCH MG: 150 TABLET ORAL at 07:52

## 2017-06-30 RX ADMIN — RANITIDINE SCH MG: 150 TABLET ORAL at 17:07

## 2017-06-30 RX ADMIN — GUAIFENESIN AND CODEINE PHOSPHATE PRN ML: 10; 100 LIQUID ORAL at 20:47

## 2017-06-30 RX ADMIN — HYDROCORTISONE SODIUM SUCCINATE SCH MLS/MIN: 100 INJECTION, POWDER, FOR SOLUTION INTRAMUSCULAR; INTRAVENOUS at 20:47

## 2017-06-30 RX ADMIN — Medication SCH MG: at 20:48

## 2017-06-30 RX ADMIN — Medication SCH ML: at 20:56

## 2017-06-30 RX ADMIN — METOPROLOL TARTRATE SCH MG: 25 TABLET, FILM COATED ORAL at 07:53

## 2017-06-30 RX ADMIN — VANCOMYCIN HYDROCHLORIDE SCH MG: 1 INJECTION, POWDER, LYOPHILIZED, FOR SOLUTION INTRAVENOUS at 20:56

## 2017-06-30 RX ADMIN — CIPROFLOXACIN HYDROCHLORIDE SCH MG: 250 TABLET, FILM COATED ORAL at 07:52

## 2017-06-30 NOTE — EMERGENCY ROOM VISIT NOTE
History


Report prepared by Mio:  Vincenzo Eastman


Under the Supervision of:  Dr. Darinel Quinteros M.D.


First contact with patient:  18:34


Chief Complaint:  REFERRED BY DOCTOR


Stated Complaint:  ABNORMAL LABS





History of Present Illness


The patient is an 81 year old female who presents to the Emergency Room with 

constant abnormal laboratory results that occurred prior to arrival. The 

patient states that she is experiencing nausea, vomiting, fevers, chills, 

weakness, and diarrhea. Pt denies LOC, headache, diaphoresis, visual changes, 

neck pain, chest pain, breathing difficulties, abdominal pain, back pain, melena

, hematochezia, urinary symptoms, numbness, lymphadenopathy, rash, or other 

complaints. She notes that she has a history of a knee replacement, and a 

partially torn IT cord. The patient states her PCP is Dr. Thomas. The patient's 

records state that she was admitted and discharged on June 10th at [facility] after being treated for a UTI and C-Diff. They report the patient's 

blood work today found that she was severely hypernatremic with a level of 115.





   Source of History:  patient, other (patient records)


   Onset:  prior to arrival


   Position:  other (global)


   Quality:  other (abnormal lab results)


   Timing:  constant


   Associated Symptoms:  + fevers, + chills, + nausea, + vomiting, + diarrhea, 

+ weakness





Review of Systems


See HPI for pertinent positives and negatives.  A total of ten systems were 

reviewed and were otherwise negative.





Past Medical & Surgical


Medical Problems:


(1) Acute kidney injury


(2) Altered mental status


(3) Anemia


(4) Dehydration


(5) Diarrhea


(6) Diverticulosis Colon (W/O Ment Of Hemorrhage)


(7) Dizziness


(8) DJD (degenerative joint disease)


(9) Esophageal Reflux


(10) Headache


(11) Hypertension


(12) Hypokalemia


(13) Hypomagnesemia


(14) Hyponatremia


(15) Hyponatremia


(16) Hypothyroidism, Unspecified


(17) Irritable Bowel Syndrome


(18) Long-Term(Current)Use Of Steroids


(19) Medication reaction


(20) Nausea & vomiting


(21) Polymyalgia Rheumatica


(22) Renal insufficiency


(23) Rheumatoid Arthritis, Unspecified


(24) SIRS (systemic inflammatory response syndrome)


(25) Syncopal episodes


(26) Syncope


(27) Thoracic radiculopathy


(28) Unspecified Urinary Incontinence


(29) Vitamin D Deficiency, Unspecified


(30) Weakness


Surgical Problems:


(1) History of hysterectomy


(2) Knee Joint Replacement Status








Family History





Heart disease


Hypertension





Social History


Smoking Status:  Never Smoker


Alcohol Use:  none


Drug Use:  none


Marital Status:  


Housing Status:  lives with family


Occupation Status:  retired





Current/Historical Medications


Scheduled


Acetaminophen (Tylenol Arthritis Ext Rel), 650 MG PO TID


Cholecalciferol (Vitamin D), 5,000 UNITS PO DAILY


Ciprofloxacin (Cipro), 250 MG PO BID


Hydrochlorothiazide (Hydrochlorothiazide), 25 MG PO DAILY


Lansoprazole (Prevacid), 30 MG PO DAILY


Levothyroxine Sodium (Synthroid), 75 MCG PO DAILY


Lisinopril (Prinivil), 20 MG PO BID


Melatonin (Kp Melatonin), 6 MG PO HS


Metoprolol Tartrate (Lopressor) (Lopressor), 25 MG PO BID


Prednisone (Prednisone), 10 MG PO QAM


Ranitidine Hcl (Zantac), 150 MG PO BID17


Saccharomyces Boulardii (Florastor), 250 MG PO BID


Sertraline (Zoloft), 25 MG PO HS


Vancomycin Hcl (Vancomycin), 250 MG PO QID


Verapamil Hcl (Verapamil Hcl Er), 120 MG PO DAILY





Scheduled PRN


Acetaminophen Tab (Tylenol), 650 MG PO Q4H PRN for Pain or Fever


Calcium Carbonate (Tums), 1 TAB PO Q4H PRN for Indigestion


Ondansetron Hcl (Zofran), 4 MG PO Q6H PRN for Nausea





Allergies


Coded Allergies:  


     Sulfa Antibiotics (Verified  Allergy, Intermediate, RASH, 6/29/17)


     Erythromycin (Verified  Adverse Reaction, Unknown, N&V, 6/29/17)


 N&V





Physical Exam


Vital Signs











  Date Time  Temp Pulse Resp B/P (MAP) Pulse Ox O2 Delivery O2 Flow Rate FiO2


 


6/29/17 21:30  78 20 159/90 97 Room Air  


 


6/29/17 20:48  84 20 176/81 98   


 


6/29/17 19:05  62 20 165/72 95   


 


6/29/17 19:04  66      


 


6/29/17 18:44     98 Room Air  


 


6/29/17 18:39 36.7 72 20 199/61 98 Room Air  











Physical Exam


GENERAL: Awake, alert, uncomfortable-appearing, in no distress


HENT: Normocephalic, atraumatic. Oropharynx unremarkable.


EYES: Normal conjunctiva. Sclera non-icteric.


NECK: Supple. No nuchal rigidity. FROM. No JVD.


RESPIRATORY: Clear to auscultation.


CARDIAC: Regular rate, normal rhythm. Extremities warm and well perfused. 

Pulses equal.


ABDOMEN: Soft, non-distended. No tenderness to palpation. No rebound or 

guarding. No masses.


RECTAL: Deferred.


MUSCULOSKELETAL: Chest examination reveals no tenderness. The back is 

symmetrical on inspection without obvious abnormality. There is no CVA 

tenderness to palpation. No joint edema. 


LOWER EXTREMITIES: Calves are equal size bilaterally and non-tender. Trace 

edema. No discoloration. 


NEURO: Normal sensorium. No sensory or motor deficits noted. 


SKIN: No rash or jaundice noted.





Medical Decision & Procedures


Laboratory Results


6/29/17 18:15








Red Blood Count 3.83, Mean Corpuscular Volume 77.5, Mean Corpuscular Hemoglobin 

25.3, Mean Corpuscular Hemoglobin Concent 32.7, Mean Platelet Volume 9.3, 

Neutrophils (%) (Auto) 83.1, Lymphocytes (%) (Auto) 10.9, Monocytes (%) (Auto) 

4.6, Eosinophils (%) (Auto) 0.5, Basophils (%) (Auto) 0.2, Neutrophils # (Auto) 

10.74, Lymphocytes # (Auto) 1.41, Monocytes # (Auto) 0.59, Eosinophils # (Auto) 

0.07, Basophils # (Auto) 0.03














Test


  6/29/17


18:15


 


White Blood Count


  12.93 K/uL


(4.8-10.8)


 


Red Blood Count


  3.83 M/uL


(4.2-5.4)


 


Hemoglobin


  9.7 g/dL


(12.0-16.0)


 


Hematocrit 29.7 % (37-47) 


 


Mean Corpuscular Volume


  77.5 fL


()


 


Mean Corpuscular Hemoglobin


  25.3 pg


(25-34)


 


Mean Corpuscular Hemoglobin


Concent 32.7 g/dl


(32-36)


 


Platelet Count


  401 K/uL


(130-400)


 


Mean Platelet Volume


  9.3 fL


(7.4-10.4)


 


Neutrophils (%) (Auto) 83.1 % 


 


Lymphocytes (%) (Auto) 10.9 % 


 


Monocytes (%) (Auto) 4.6 % 


 


Eosinophils (%) (Auto) 0.5 % 


 


Basophils (%) (Auto) 0.2 % 


 


Neutrophils # (Auto)


  10.74 K/uL


(1.4-6.5)


 


Lymphocytes # (Auto)


  1.41 K/uL


(1.2-3.4)


 


Monocytes # (Auto)


  0.59 K/uL


(0.11-0.59)


 


Eosinophils # (Auto)


  0.07 K/uL


(0-0.5)


 


Basophils # (Auto)


  0.03 K/uL


(0-0.2)


 


RDW Standard Deviation


  41.9 fL


(36.4-46.3)


 


RDW Coefficient of Variation


  14.7 %


(11.5-14.5)


 


Immature Granulocyte % (Auto) 0.7 % 


 


Immature Granulocyte # (Auto)


  0.09 K/uL


(0.00-0.02)


 


Osmolality


  248 mOsm/kg


(280-300)


 


Magnesium Level


  1.9 mg/dl


(1.8-2.4)


 


Total Bilirubin


  0.3 mg/dl


(0.2-1)


 


Direct Bilirubin


  < 0.1 mg/dl


(0-0.2)


 


Aspartate Amino Transf


(AST/SGOT) 10 U/L (15-37) 


 


 


Alanine Aminotransferase


(ALT/SGPT) 18 U/L (12-78) 


 


 


Alkaline Phosphatase


  56 U/L


()


 


Troponin I


  < 0.015 ng/ml


(0-0.045)


 


Total Protein


  7.4 gm/dl


(6.4-8.2)


 


Albumin


  3.5 gm/dl


(3.4-5.0)


 


Lipase


  139 U/L


()


 


Thyroid Stimulating Hormone


(TSH) 2.920 uIu/ml


(0.300-4.500)





Laboratory results reviewed by me





Medications Administered











 Medications


  (Trade)  Dose


 Ordered  Sig/Robinson


 Route  Start Time


 Stop Time Status Last Admin


Dose Admin


 


 Sodium Chloride  1,000 ml @ 


 125 mls/hr  Q8H STAT


 IV  6/29/17 18:39


 6/29/17 22:32 DC 6/29/17 19:04


125 MLS/HR


 


 Ondansetron HCl


  (Zofran 8mg Iv)  8 mg  NOW  STAT


 IV  6/29/17 19:00


 6/29/17 19:02 DC 6/29/17 19:04


8 MG











ECG


Indication:  weakness


Rate (beats per minute):  69


Rhythm:  sinus rhythm


Findings:  no acute ischemic change, other (Premature Supraventricular Complexes

)





ED Course


1839: Ordered Sodium Chloride 1000 ml @ 125 mls/hr IV





1853: The patient was evaluated in room C10. A complete history and physical 

exam was performed.





1900: Ordered Zofran 8mg IV





1950: I reevaluated the patient, and she if feeling better. I discussed her 

exam findings and treatment plan. She verbalized complete agreement.





2005: I discussed the patient's case with Dr. Szymanski [facility] Hospitalist. 

The patient will be evaluated for further treatment.





Medical Decision


Medication Reconciliation: I attest that I have personally reviewed the patient'

s current medication list





Blood pressure screening: Patient was found to have an elevated blood pressure 

and was referred to their primary doctor for recheck and further treatment.





Prior records/ancillary studies reviewed and summarized above.





Nursing notes reviewed and agree them.





Additional history obtained from family.





The patient's history was concerning for altered mental status.





Differential diagnosis:


Etiologies such as infection, hypoglycemia, electrolyte abnormalities, cardiac 

sources, intracerebral event, toxicologic, neurologic, as well as others were 

entertained.   





Physical examination:


As above.





ER treatment provided:


IV Lock


Normal saline hydration at 125ml per hour


On reassessment the patient felt better.





Diagnostics interpretation by me:


ECG: As above





The labs revealed mild leukocytosis of 12.9.  The patient has mild anemia at 

9.7.  Sodium was confirmed very low at 115.  Troponin was negative.  Urinalysis 

pending.





The patient has severe hyponatremia.  She will need admission to the hospital.





Consultation:


A consultation was placed with the hospitalist. The case was discussed and 

diagnostics were reviewed.  The patient was evaluated in the ER for further 

treatment.





Consults


Time Called:  2003


Consulting Physician:  Dr. Szymanski [facility] Hospitalist


Returned Call:  2005


I discussed the patient's case with Dr. Szymanski [facility] Hospitalist. The 

patient will be evaluated for further treatment.





Impression





 Primary Impression:  


 Hyponatremia


 Additional Impressions:  


 Vomiting


 Weakness





Scribe Attestation


The scribe's documentation has been prepared under my direction and personally 

reviewed by me in its entirety. I confirm that the note above accurately 

reflects all work, treatment, procedures, and medical decision making performed 

by me.





Departure Information


Dispostion


Being Evaluated By Hospitalist





Referrals


García Thomas D.O. (PCP)





Patient Instructions


[facility]





Problem Qualifiers

## 2017-06-30 NOTE — PROGRESS NOTE
Subjective


Date of Service:


Jun 30, 2017.


Subjective


Pt evaluation today including:  conversation w/ patient, physical exam, lab 

review, review of inpatient medication list


Pain:  no pain


PO Intake:  adequate


Voiding:  no voiding problems


patient reports she was doing well at rehab up until a few days ago


she started to get weak


c/o non-productive cough and sinus congestion at [facility], however, slightly 

better here


had a loose stool this AM, but overall diarrhea much better on Vancomycin





she admits that she drinks a lot of water, about 3 pitchers (600cc each) per day


no real signs of polydipsia





labs reviewed, Na coming up to 122 which is fine for the first 24 hours


serum osm low at 248 but urine osm appropriately low at 108, so trying to get 

rid of excess water





Problem List


Medical Problems:


(1) Anemia


Status: Chronic  





(2) Diverticulosis Colon (W/O Ment Of Hemorrhage)


Status: Chronic  





(3) DJD (degenerative joint disease)


Status: Chronic  





(4) Esophageal Reflux


Status: Chronic  





(5) Hypertension


Status: Chronic  





(6) Hypothyroidism, Unspecified


Status: Chronic  





(7) Irritable Bowel Syndrome


Status: Chronic  





(8) Long-Term(Current)Use Of Steroids


Status: Chronic  





(9) Polymyalgia Rheumatica


Status: Chronic  





(10) Rheumatoid Arthritis, Unspecified


Status: Chronic  





(11) Unspecified Urinary Incontinence


Status: Chronic  





(12) Urinary tract infection


Status: Acute  





(13) Vitamin D Deficiency, Unspecified


Status: Chronic  





(14) Vomiting


Status: Acute  





(15) Vomiting and diarrhea


Status: Acute  











Review of Systems


Constitutional:  + weakness, + fatigue


ENT:  + nasal symptoms (congestion)


Respiratory:  + cough


Abdomen:  + diarrhea (one loose stool)


All Other Systems:  Reviewed and Negative





Medications





Current Inpatient Medications








 Medications


  (Trade)  Dose


 Ordered  Sig/Robinson


 Route  Start Time


 Stop Time Status Last Admin


Dose Admin


 


 Acetaminophen


  (Tylenol Tab)  650 mg  Q4H  PRN


 PO  6/29/17 21:30


 7/29/17 21:29   


 


 


 Calcium Carbonate


  (Tums Chew Tab)  500 mg  Q4H  PRN


 PO  6/29/17 21:30


 7/29/17 21:29   


 


 


 Levothyroxine


 Sodium


  (Synthroid Tab)  75 mcg  DAILYBB


 PO  6/30/17 06:30


 7/30/17 06:29  6/30/17 05:56


75 MCG


 


 Lisinopril


  (Zestril Tab)  20 mg  BID


 PO  6/30/17 09:00


 7/30/17 08:59  6/30/17 07:53


20 MG


 


 Metoprolol


 Tartrate


  (Lopressor Tab)  25 mg  BID


 PO  6/30/17 09:00


 7/30/17 08:59  6/30/17 07:53


25 MG


 


 Ondansetron HCl


  (Zofran Tab)  4 mg  Q6H  PRN


 PO  6/29/17 21:30


 7/29/17 21:29   


 


 


 Ranitidine HCl


  (zANTac TAB)  150 mg  BID17


 PO  6/30/17 09:00


 7/30/17 08:59  6/30/17 07:52


150 MG


 


 Saccharomyces


 Boulardii


  (Florastor Cap)  250 mg  BID


 PO  6/30/17 09:00


 7/30/17 08:59  6/30/17 07:52


250 MG


 


 Sertraline HCl


  (Zoloft Tab)  25 mg  HS


 PO  6/30/17 21:00


 7/30/17 20:59   


 


 


 Vancomycin HCl


  (Vancomycin Oral


 Soln)  250 mg  QID


 PO  6/29/17 23:30


 7/13/17 23:29  6/30/17 13:09


250 MG


 


 Verapamil HCl


  (Calan-Sr Tab)  120 mg  DAILY


 PO  6/30/17 09:00


 7/30/17 08:59  6/30/17 07:53


120 MG


 


 Acetaminophen


  (Tylenol Tab)  650 mg  TID


 PO  6/30/17 09:00


 7/30/17 08:59  6/30/17 13:55


650 MG


 


 Cholecalciferol


  (Vitamin D Tab)  5,000


 inter.unit  DAILY


 PO  6/30/17 09:00


 7/30/17 08:59  6/30/17 07:53


5,000 INTER.UNIT


 


 Pantoprazole


 Sodium


  (Protonix Tab)  40 mg  DAILY


 PO  6/30/17 09:00


 7/30/17 08:59  6/30/17 07:52


40 MG


 


 Hydrocortisone


 Sodium Succinate


 100 mg/Syringe  2 ml @ 4


 mls/min  Q8


 IV  6/29/17 23:30


 7/29/17 23:29  6/30/17 13:54


4 MLS/MIN


 


 Ciprofloxacin


  (Ciprofloxacin


 Tab)  250 mg  BID


 PO  6/30/17 09:00


 7/1/17 09:00  6/30/17 07:52


250 MG


 


 Raspberry


  (Raspberry Syrup


 5ml Cup)  5 ml  QID


 PO  6/29/17 23:30


 7/13/17 23:29  6/30/17 13:08


5 ML











Objective


Vital Signs











  Date Time  Temp Pulse Resp B/P (MAP) Pulse Ox O2 Delivery O2 Flow Rate FiO2


 


6/30/17 08:00      Room Air  


 


6/30/17 07:30 36.8 74 18 128/71 (90) 95   


 


6/30/17 00:00     98 Room Air  


 


6/29/17 22:58 36.6 68 20 153/67 98 Room Air  


 


6/29/17 21:30  78 20 159/90 97 Room Air  


 


6/29/17 20:48  84 20 176/81 98   


 


6/29/17 19:05  62 20 165/72 95   


 


6/29/17 19:04  66      


 


6/29/17 18:44     98 Room Air  


 


6/29/17 18:39 36.7 72 20 199/61 98 Room Air  











Physical Exam


General Appearance:  WD/WN, no apparent distress


Eyes:  normal inspection, EOMI, sclerae normal


ENT:  hearing grossly normal, pharynx normal, + nasal congestion, + nasal 

drainage


Neck:  supple, no adenopathy, no JVD, trachea midline


Respiratory/Chest:  chest non-tender, lungs clear, normal breath sounds, no 

respiratory distress, no accessory muscle use


Cardiovascular:  regular rate, rhythm, no edema, no gallop, no JVD, no murmur


Abdomen:  normal bowel sounds, non tender, soft, no organomegaly


Extremities:  normal range of motion, non-tender, normal inspection, no pedal 

edema, no calf tenderness, pelvis stable


Neurologic/Psychiatric:  CNs II-XII nml as tested, no motor/sensory deficits, 

alert, normal mood/affect, oriented x 3


Skin:  normal color, warm/dry, no rash


Lymphatic:  no adenopathy





Laboratory Results





Last 24 Hours








Test


  6/29/17


18:15 6/29/17


23:25 6/30/17


02:30 6/30/17


03:47


 


White Blood Count 12.93 K/uL    


 


Red Blood Count 3.83 M/uL    


 


Hemoglobin 9.7 g/dL    


 


Hematocrit 29.7 %    


 


Mean Corpuscular Volume 77.5 fL    


 


Mean Corpuscular Hemoglobin 25.3 pg    


 


Mean Corpuscular Hemoglobin


Concent 32.7 g/dl 


  


  


  


 


 


Platelet Count 401 K/uL    


 


Mean Platelet Volume 9.3 fL    


 


Neutrophils (%) (Auto) 83.1 %    


 


Lymphocytes (%) (Auto) 10.9 %    


 


Monocytes (%) (Auto) 4.6 %    


 


Eosinophils (%) (Auto) 0.5 %    


 


Basophils (%) (Auto) 0.2 %    


 


Neutrophils # (Auto) 10.74 K/uL    


 


Lymphocytes # (Auto) 1.41 K/uL    


 


Monocytes # (Auto) 0.59 K/uL    


 


Eosinophils # (Auto) 0.07 K/uL    


 


Basophils # (Auto) 0.03 K/uL    


 


RDW Standard Deviation 41.9 fL    


 


RDW Coefficient of Variation 14.7 %    


 


Immature Granulocyte % (Auto) 0.7 %    


 


Immature Granulocyte # (Auto) 0.09 K/uL    


 


Sodium Level 115 mmol/L  118 mmol/L   122 mmol/L 


 


Potassium Level 4.3 mmol/L  3.8 mmol/L   3.8 mmol/L 


 


Chloride Level 78 mmol/L  84 mmol/L   86 mmol/L 


 


Carbon Dioxide Level 25 mmol/L  26 mmol/L   26 mmol/L 


 


Anion Gap 12.0 mmol/L  8.0 mmol/L   10.0 mmol/L 


 


Blood Urea Nitrogen 17 mg/dl  14 mg/dl   13 mg/dl 


 


Creatinine 1.10 mg/dl  1.00 mg/dl   0.97 mg/dl 


 


Est Creatinine Clear Calc


Drug Dose 35.8 ml/min 


  39.4 ml/min 


  


  40.6 ml/min 


 


 


Estimated GFR (


American) 54.9 


  61.6 


  


  63.9 


 


 


Estimated GFR (Non-


American 47.4 


  53.2 


  


  55.2 


 


 


BUN/Creatinine Ratio 15.0  14.1   13.2 


 


Random Glucose 117 mg/dl  93 mg/dl   103 mg/dl 


 


Osmolality 248 mOsm/kg    


 


Calcium Level 9.9 mg/dl  8.8 mg/dl   9.0 mg/dl 


 


Magnesium Level 1.9 mg/dl    


 


Total Bilirubin 0.3 mg/dl    


 


Direct Bilirubin < 0.1 mg/dl    


 


Aspartate Amino Transf


(AST/SGOT) 10 U/L 


  


  


  


 


 


Alanine Aminotransferase


(ALT/SGPT) 18 U/L 


  


  


  


 


 


Alkaline Phosphatase 56 U/L    


 


Troponin I < 0.015 ng/ml    


 


Total Protein 7.4 gm/dl    


 


Albumin 3.5 gm/dl    


 


Lipase 139 U/L    


 


Thyroid Stimulating Hormone


(TSH) 2.920 uIu/ml 


  


  


  


 


 


Urine Osmolality   109 mOms/kg  


 


Test


  6/30/17


07:55 6/30/17


12:06 


  


 


 


Sodium Level 122 mmol/L  122 mmol/L   


 


Potassium Level 4.0 mmol/L  3.9 mmol/L   


 


Chloride Level 87 mmol/L  86 mmol/L   


 


Carbon Dioxide Level 27 mmol/L  26 mmol/L   


 


Anion Gap 8.0 mmol/L  10.0 mmol/L   


 


Blood Urea Nitrogen 13 mg/dl  14 mg/dl   


 


Creatinine 0.97 mg/dl  1.20 mg/dl   


 


Est Creatinine Clear Calc


Drug Dose 40.6 ml/min 


  32.9 ml/min 


  


  


 


 


Estimated GFR (


American) 63.9 


  49.4 


  


  


 


 


Estimated GFR (Non-


American 55.2 


  42.7 


  


  


 


 


BUN/Creatinine Ratio 13.0  11.4   


 


Random Glucose 111 mg/dl  115 mg/dl   


 


Calcium Level 9.2 mg/dl  9.6 mg/dl   











Assessment and Plan


81 year old female currently being treated at [facility] for c.diff and 

uti brought to the ED for severe fatigue and hyponatremia 





Hyponatremia


- sodium in the ED was 115, slowly rising up to 122 with NSS overnight, stopped 

this AM


- low serum osm with appropriately low urine osm, so no signs of SIADH


- stop NSS, strict fluid restriction, start 1gm NaCl daily


- stop HCTZ


- repeat BMP and osmolalities later today and then in the AM





Cough


- dry, due to nasal congestion, post nasal drip


- Robitussin





UTI


- continue Cipro until 7/1





C.Diff


- continue vanc PO until June 30th to finish full course


- less diarrhea, clinically improving





HTN


- stop HCTZ with the hyponatremia


- continue lisinopril, metoprolol and verapamil





Hypothyroidism


- continue synthroid





RS3PE/Inflammatory Polyarthritis


- currently not on any medication as hydroxycholoroquine caused the initial 

diarrhea and c.diff


- follows with Dr. Coppola





Depression


- continue sertraline


- if hyponatremia continues to be problematic may need to stop SSRI as well





GERD


- continue prevacid and zantac





PMR


- hold prednisone. 10mg daily is home dose





Diet


- regular





DVT prophylaxis


- SCD's and TEDS





Code


- DNR

## 2017-07-01 VITALS — SYSTOLIC BLOOD PRESSURE: 144 MMHG | DIASTOLIC BLOOD PRESSURE: 71 MMHG | HEART RATE: 69 BPM

## 2017-07-01 VITALS
DIASTOLIC BLOOD PRESSURE: 67 MMHG | OXYGEN SATURATION: 95 % | TEMPERATURE: 97.88 F | HEART RATE: 69 BPM | SYSTOLIC BLOOD PRESSURE: 169 MMHG

## 2017-07-01 VITALS
TEMPERATURE: 97.7 F | SYSTOLIC BLOOD PRESSURE: 153 MMHG | DIASTOLIC BLOOD PRESSURE: 79 MMHG | HEART RATE: 68 BPM | OXYGEN SATURATION: 97 %

## 2017-07-01 VITALS
HEART RATE: 59 BPM | SYSTOLIC BLOOD PRESSURE: 110 MMHG | OXYGEN SATURATION: 94 % | TEMPERATURE: 98.24 F | DIASTOLIC BLOOD PRESSURE: 45 MMHG

## 2017-07-01 VITALS — SYSTOLIC BLOOD PRESSURE: 129 MMHG | HEART RATE: 64 BPM | DIASTOLIC BLOOD PRESSURE: 56 MMHG

## 2017-07-01 LAB
ANION GAP SERPL CALC-SCNC: 7 MMOL/L (ref 3–11)
ANION GAP SERPL CALC-SCNC: 8 MMOL/L (ref 3–11)
BUN SERPL-MCNC: 23 MG/DL (ref 7–18)
BUN SERPL-MCNC: 28 MG/DL (ref 7–18)
BUN/CREAT SERPL: 16.6 (ref 10–20)
BUN/CREAT SERPL: 20 (ref 10–20)
CALCIUM SERPL-MCNC: 8.6 MG/DL (ref 8.5–10.1)
CALCIUM SERPL-MCNC: 9 MG/DL (ref 8.5–10.1)
CHLORIDE SERPL-SCNC: 91 MMOL/L (ref 98–107)
CHLORIDE SERPL-SCNC: 96 MMOL/L (ref 98–107)
CO2 SERPL-SCNC: 26 MMOL/L (ref 21–32)
CO2 SERPL-SCNC: 26 MMOL/L (ref 21–32)
CREAT CL PREDICTED SERPL C-G-VRATE: 28.2 ML/MIN
CREAT CL PREDICTED SERPL C-G-VRATE: 28.2 ML/MIN
CREAT SERPL-MCNC: 1.4 MG/DL (ref 0.6–1.2)
CREAT SERPL-MCNC: 1.4 MG/DL (ref 0.6–1.2)
GLUCOSE SERPL-MCNC: 115 MG/DL (ref 70–99)
GLUCOSE SERPL-MCNC: 160 MG/DL (ref 70–99)
MAGNESIUM SERPL-MCNC: 2 MG/DL (ref 1.8–2.4)
POTASSIUM SERPL-SCNC: 3.7 MMOL/L (ref 3.5–5.1)
POTASSIUM SERPL-SCNC: 4 MMOL/L (ref 3.5–5.1)
SODIUM SERPL-SCNC: 125 MMOL/L (ref 136–145)
SODIUM SERPL-SCNC: 129 MMOL/L (ref 136–145)

## 2017-07-01 RX ADMIN — RANITIDINE SCH MG: 150 TABLET ORAL at 16:55

## 2017-07-01 RX ADMIN — ACETAMINOPHEN SCH MG: 325 TABLET ORAL at 20:53

## 2017-07-01 RX ADMIN — LEVOTHYROXINE SODIUM SCH MCG: 75 TABLET ORAL at 05:49

## 2017-07-01 RX ADMIN — Medication SCH ML: at 16:54

## 2017-07-01 RX ADMIN — SERTRALINE HYDROCHLORIDE SCH MG: 50 TABLET, FILM COATED ORAL at 20:51

## 2017-07-01 RX ADMIN — HYDROCORTISONE SODIUM SUCCINATE SCH MLS/MIN: 100 INJECTION, POWDER, FOR SOLUTION INTRAMUSCULAR; INTRAVENOUS at 21:38

## 2017-07-01 RX ADMIN — Medication SCH MG: at 20:50

## 2017-07-01 RX ADMIN — Medication SCH INTER.UNIT: at 07:56

## 2017-07-01 RX ADMIN — ACETAMINOPHEN SCH MG: 325 TABLET ORAL at 07:56

## 2017-07-01 RX ADMIN — ACETAMINOPHEN SCH MG: 325 TABLET ORAL at 13:51

## 2017-07-01 RX ADMIN — LISINOPRIL SCH MG: 20 TABLET ORAL at 07:57

## 2017-07-01 RX ADMIN — LISINOPRIL SCH MG: 20 TABLET ORAL at 20:51

## 2017-07-01 RX ADMIN — METOPROLOL TARTRATE SCH MG: 25 TABLET, FILM COATED ORAL at 20:52

## 2017-07-01 RX ADMIN — Medication SCH ML: at 20:58

## 2017-07-01 RX ADMIN — PANTOPRAZOLE SCH MG: 40 TABLET, DELAYED RELEASE ORAL at 07:57

## 2017-07-01 RX ADMIN — CIPROFLOXACIN HYDROCHLORIDE SCH MG: 250 TABLET, FILM COATED ORAL at 07:56

## 2017-07-01 RX ADMIN — SODIUM CHLORIDE SCH MLS/HR: 900 INJECTION, SOLUTION INTRAVENOUS at 12:45

## 2017-07-01 RX ADMIN — METOPROLOL TARTRATE SCH MG: 25 TABLET, FILM COATED ORAL at 07:56

## 2017-07-01 RX ADMIN — VANCOMYCIN HYDROCHLORIDE SCH MG: 1 INJECTION, POWDER, LYOPHILIZED, FOR SOLUTION INTRAVENOUS at 20:58

## 2017-07-01 RX ADMIN — VANCOMYCIN HYDROCHLORIDE SCH MG: 1 INJECTION, POWDER, LYOPHILIZED, FOR SOLUTION INTRAVENOUS at 12:51

## 2017-07-01 RX ADMIN — VERAPAMIL HYDROCHLORIDE SCH MG: 120 TABLET, FILM COATED, EXTENDED RELEASE ORAL at 07:55

## 2017-07-01 RX ADMIN — Medication SCH MG: at 07:55

## 2017-07-01 RX ADMIN — Medication SCH ML: at 12:51

## 2017-07-01 RX ADMIN — RANITIDINE SCH MG: 150 TABLET ORAL at 07:56

## 2017-07-01 RX ADMIN — SODIUM CHLORIDE TAB 1 GM SCH GM: 1 TAB at 07:56

## 2017-07-01 RX ADMIN — GUAIFENESIN AND CODEINE PHOSPHATE PRN ML: 10; 100 LIQUID ORAL at 20:59

## 2017-07-01 RX ADMIN — VANCOMYCIN HYDROCHLORIDE SCH MG: 1 INJECTION, POWDER, LYOPHILIZED, FOR SOLUTION INTRAVENOUS at 07:55

## 2017-07-01 RX ADMIN — HYDROCORTISONE SODIUM SUCCINATE SCH MLS/MIN: 100 INJECTION, POWDER, FOR SOLUTION INTRAMUSCULAR; INTRAVENOUS at 13:50

## 2017-07-01 RX ADMIN — Medication SCH ML: at 07:55

## 2017-07-01 RX ADMIN — VANCOMYCIN HYDROCHLORIDE SCH MG: 1 INJECTION, POWDER, LYOPHILIZED, FOR SOLUTION INTRAVENOUS at 16:54

## 2017-07-01 RX ADMIN — HYDROCORTISONE SODIUM SUCCINATE SCH MLS/MIN: 100 INJECTION, POWDER, FOR SOLUTION INTRAMUSCULAR; INTRAVENOUS at 05:50

## 2017-07-01 NOTE — PROGRESS NOTE
Subjective


Date of Service:


Jul 1, 2017.


Subjective


Pt evaluation today including:  conversation w/ patient, physical exam, lab 

review, review of inpatient medication list


Pain:  no pain


PO Intake:  adequate


Voiding:  no voiding problems


reviewed labs, Na up to 125, osmolality rising to 263, urine osmolality going up





patient feels well, denies diarrhea today, no pain, eating and drinking well


planning to participate in therapy





Problem List


Medical Problems:


(1) Anemia


Status: Chronic  





(2) Diverticulosis Colon (W/O Ment Of Hemorrhage)


Status: Chronic  





(3) DJD (degenerative joint disease)


Status: Chronic  





(4) Esophageal Reflux


Status: Chronic  





(5) Hypertension


Status: Chronic  





(6) Hypothyroidism, Unspecified


Status: Chronic  





(7) Irritable Bowel Syndrome


Status: Chronic  





(8) Long-Term(Current)Use Of Steroids


Status: Chronic  





(9) Polymyalgia Rheumatica


Status: Chronic  





(10) Rheumatoid Arthritis, Unspecified


Status: Chronic  





(11) Unspecified Urinary Incontinence


Status: Chronic  





(12) Urinary tract infection


Status: Acute  





(13) Vitamin D Deficiency, Unspecified


Status: Chronic  





(14) Vomiting


Status: Acute  





(15) Vomiting and diarrhea


Status: Acute  











Review of Systems


All Other Systems:  Reviewed and Negative





Medications





Current Inpatient Medications








 Medications


  (Trade)  Dose


 Ordered  Sig/Robinson


 Route  Start Time


 Stop Time Status Last Admin


Dose Admin


 


 Acetaminophen


  (Tylenol Tab)  650 mg  Q4H  PRN


 PO  6/29/17 21:30


 7/29/17 21:29   


 


 


 Calcium Carbonate


  (Tums Chew Tab)  500 mg  Q4H  PRN


 PO  6/29/17 21:30


 7/29/17 21:29   


 


 


 Levothyroxine


 Sodium


  (Synthroid Tab)  75 mcg  DAILYBB


 PO  6/30/17 06:30


 7/30/17 06:29  7/1/17 05:49


75 MCG


 


 Lisinopril


  (Zestril Tab)  20 mg  BID


 PO  6/30/17 09:00


 7/30/17 08:59  7/1/17 07:57


20 MG


 


 Metoprolol


 Tartrate


  (Lopressor Tab)  25 mg  BID


 PO  6/30/17 09:00


 7/30/17 08:59  7/1/17 07:56


25 MG


 


 Ondansetron HCl


  (Zofran Tab)  4 mg  Q6H  PRN


 PO  6/29/17 21:30


 7/29/17 21:29   


 


 


 Ranitidine HCl


  (zANTac TAB)  150 mg  BID17


 PO  6/30/17 09:00


 7/30/17 08:59  7/1/17 07:56


150 MG


 


 Saccharomyces


 Boulardii


  (Florastor Cap)  250 mg  BID


 PO  6/30/17 09:00


 7/30/17 08:59  7/1/17 07:55


250 MG


 


 Sertraline HCl


  (Zoloft Tab)  25 mg  HS


 PO  6/30/17 21:00


 7/30/17 20:59  6/30/17 20:50


25 MG


 


 Vancomycin HCl


  (Vancomycin Oral


 Soln)  250 mg  QID


 PO  6/29/17 23:30


 7/13/17 23:29  7/1/17 07:55


250 MG


 


 Verapamil HCl


  (Calan-Sr Tab)  120 mg  DAILY


 PO  6/30/17 09:00


 7/30/17 08:59  7/1/17 07:55


120 MG


 


 Acetaminophen


  (Tylenol Tab)  650 mg  TID


 PO  6/30/17 09:00


 7/30/17 08:59  7/1/17 07:56


650 MG


 


 Cholecalciferol


  (Vitamin D Tab)  5,000


 inter.unit  DAILY


 PO  6/30/17 09:00


 7/30/17 08:59  7/1/17 07:56


5,000 INTER.UNIT


 


 Pantoprazole


 Sodium


  (Protonix Tab)  40 mg  DAILY


 PO  6/30/17 09:00


 7/30/17 08:59  7/1/17 07:57


40 MG


 


 Hydrocortisone


 Sodium Succinate


 100 mg/Syringe  2 ml @ 4


 mls/min  Q8


 IV  6/29/17 23:30


 7/29/17 23:29  7/1/17 05:50


4 MLS/MIN


 


 Raspberry


  (Raspberry Syrup


 5ml Cup)  5 ml  QID


 PO  6/29/17 23:30


 7/13/17 23:29  7/1/17 07:55


5 ML


 


 Sodium Chloride


  (Sodium Chloride


 Tab)  1 gm  DAILY


 PO  7/1/17 09:00


 7/31/17 08:59  7/1/17 07:56


1 GM


 


 Codeine Phosphate/


 Guaifenesin


  (Robitussin-AC


 Sugar Free Syrup)  5 ml  Q6H  PRN


 PO  6/30/17 14:45


 7/30/17 14:44  6/30/17 20:47


5 ML


 


 Sodium Chloride  1,000 ml @ 


 75 mls/hr  [id number]


 IV  7/1/17 12:15


 7/31/17 12:14 UNV  


 











Objective


Vital Signs











  Date Time  Temp Pulse Resp B/P (MAP) Pulse Ox O2 Delivery O2 Flow Rate FiO2


 


7/1/17 08:00      Room Air  


 


7/1/17 07:52 36.6 69 18 181/67 (105) 95   





    169/67 (101)    


 


7/1/17 00:00      Room Air  


 


6/30/17 23:15 36.9 65 18 119/70 (86) 96 Room Air  


 


6/30/17 16:24 36.6 62 16 121/77 (92) 95 Room Air  


 


6/30/17 16:00     95 Room Air  











Physical Exam


General Appearance:  WD/WN, no apparent distress


Eyes:  normal inspection, EOMI, sclerae normal


Respiratory/Chest:  chest non-tender, lungs clear, normal breath sounds, no 

respiratory distress, no accessory muscle use


Cardiovascular:  regular rate, rhythm, no edema, no gallop, no JVD, no murmur


Abdomen:  normal bowel sounds, non tender, soft, no organomegaly


Extremities:  normal range of motion, non-tender, normal inspection, no pedal 

edema, no calf tenderness, pelvis stable


Neurologic/Psychiatric:  CNs II-XII nml as tested, no motor/sensory deficits, 

alert, normal mood/affect, oriented x 3


Skin:  normal color, warm/dry, no rash


Lymphatic:  no adenopathy





Laboratory Results





Last 24 Hours








Test


  6/30/17


16:25 7/1/17


06:02 7/1/17


10:31


 


Sodium Level 121 mmol/L  125 mmol/L  


 


Potassium Level 3.7 mmol/L  3.7 mmol/L  


 


Chloride Level 88 mmol/L  91 mmol/L  


 


Carbon Dioxide Level 25 mmol/L  26 mmol/L  


 


Anion Gap 8.0 mmol/L  8.0 mmol/L  


 


Blood Urea Nitrogen 18 mg/dl  23 mg/dl  


 


Creatinine 1.30 mg/dl  1.40 mg/dl  


 


Est Creatinine Clear Calc


Drug Dose 30.3 ml/min 


  28.2 ml/min 


  


 


 


Estimated GFR (


American) 44.9 


  41.0 


  


 


 


Estimated GFR (Non-


American 38.7 


  35.4 


  


 


 


BUN/Creatinine Ratio 14.0  16.6  


 


Random Glucose 157 mg/dl  115 mg/dl  


 


Osmolality 260 mOsm/kg  265 mOsm/kg  


 


Calcium Level 8.7 mg/dl  9.0 mg/dl  


 


Magnesium Level  2.0 mg/dl  


 


Urine Osmolality   313 mOms/kg 











Assessment and Plan


81 year old female currently being treated at [facility] for c.diff and 

uti brought to the ED for severe fatigue and hyponatremia 





Hyponatremia


- sodium in the ED was 115, up to 125 over past 36 hours which is appropriate


- Cr up to 1.4 and BUN slowly rising on 1200cc fluid restriction


   will liberalize the fluids to 1800cc and add NSS at 75cc/hr for the next 24 

hours


- low serum osm with appropriately low urine osm, so no signs of SIADH on 

admission


- continue NaCl 1gm daily


- stop HCTZ indefinitely


- repeat BMP and osmolalities later today and then in the AM again





Cough


- dry, due to nasal congestion, post nasal drip


- Robitussin





UTI


- continue Cipro, stop after today





C.Diff


- finished PO Vanco yesterday


- no diarrhea today, solid BM





HTN


- stop HCTZ with the hyponatremia


- continue lisinopril, metoprolol and verapamil





Hypothyroidism


- continue synthroid





RS3PE/Inflammatory Polyarthritis


- currently not on any medication as hydroxycholoroquine caused the initial 

diarrhea and c.diff


- follows with Dr. Coppola





Depression


- continue sertraline


- if hyponatremia continues to be problematic may need to stop SSRI as well





GERD


- continue prevacid and zantac





PMR


- resume Prednisone 10mg daily





Diet


- regular, 1800cc fluid restriction





DVT prophylaxis


- SCD's and TEDS





Code


- DNR





repeat labs tomorrow, PT/OT evals, d/c to home with home health once medically 

stable

## 2017-07-02 VITALS
DIASTOLIC BLOOD PRESSURE: 74 MMHG | OXYGEN SATURATION: 96 % | TEMPERATURE: 98.24 F | SYSTOLIC BLOOD PRESSURE: 174 MMHG | HEART RATE: 61 BPM

## 2017-07-02 VITALS
OXYGEN SATURATION: 97 % | DIASTOLIC BLOOD PRESSURE: 60 MMHG | TEMPERATURE: 97.7 F | SYSTOLIC BLOOD PRESSURE: 164 MMHG | HEART RATE: 59 BPM

## 2017-07-02 VITALS — OXYGEN SATURATION: 97 %

## 2017-07-02 LAB
ANION GAP SERPL CALC-SCNC: 8 MMOL/L (ref 3–11)
BUN SERPL-MCNC: 25 MG/DL (ref 7–18)
BUN/CREAT SERPL: 22.4 (ref 10–20)
CALCIUM SERPL-MCNC: 8.6 MG/DL (ref 8.5–10.1)
CHLORIDE SERPL-SCNC: 101 MMOL/L (ref 98–107)
CO2 SERPL-SCNC: 26 MMOL/L (ref 21–32)
CREAT CL PREDICTED SERPL C-G-VRATE: 35.8 ML/MIN
CREAT SERPL-MCNC: 1.1 MG/DL (ref 0.6–1.2)
GLUCOSE SERPL-MCNC: 124 MG/DL (ref 70–99)
POTASSIUM SERPL-SCNC: 4 MMOL/L (ref 3.5–5.1)
SODIUM SERPL-SCNC: 135 MMOL/L (ref 136–145)

## 2017-07-02 RX ADMIN — Medication SCH ML: at 09:05

## 2017-07-02 RX ADMIN — RANITIDINE SCH MG: 150 TABLET ORAL at 09:06

## 2017-07-02 RX ADMIN — HYDROCORTISONE SODIUM SUCCINATE SCH MLS/MIN: 100 INJECTION, POWDER, FOR SOLUTION INTRAMUSCULAR; INTRAVENOUS at 06:09

## 2017-07-02 RX ADMIN — RANITIDINE SCH MG: 150 TABLET ORAL at 17:03

## 2017-07-02 RX ADMIN — LISINOPRIL SCH MG: 20 TABLET ORAL at 09:06

## 2017-07-02 RX ADMIN — Medication SCH MG: at 21:02

## 2017-07-02 RX ADMIN — SERTRALINE HYDROCHLORIDE SCH MG: 50 TABLET, FILM COATED ORAL at 21:04

## 2017-07-02 RX ADMIN — METOPROLOL TARTRATE SCH MG: 25 TABLET, FILM COATED ORAL at 09:06

## 2017-07-02 RX ADMIN — ACETAMINOPHEN SCH MG: 325 TABLET ORAL at 21:03

## 2017-07-02 RX ADMIN — LISINOPRIL SCH MG: 20 TABLET ORAL at 21:03

## 2017-07-02 RX ADMIN — ACETAMINOPHEN SCH MG: 325 TABLET ORAL at 09:06

## 2017-07-02 RX ADMIN — VANCOMYCIN HYDROCHLORIDE SCH MG: 1 INJECTION, POWDER, LYOPHILIZED, FOR SOLUTION INTRAVENOUS at 21:17

## 2017-07-02 RX ADMIN — Medication SCH ML: at 21:02

## 2017-07-02 RX ADMIN — Medication SCH ML: at 17:07

## 2017-07-02 RX ADMIN — CHOLESTYRAMINE SCH GM: 4 POWDER, FOR SUSPENSION ORAL at 22:18

## 2017-07-02 RX ADMIN — Medication SCH MG: at 09:06

## 2017-07-02 RX ADMIN — VERAPAMIL HYDROCHLORIDE SCH MG: 120 TABLET, FILM COATED, EXTENDED RELEASE ORAL at 09:06

## 2017-07-02 RX ADMIN — SODIUM CHLORIDE SCH MLS/HR: 900 INJECTION, SOLUTION INTRAVENOUS at 01:04

## 2017-07-02 RX ADMIN — VANCOMYCIN HYDROCHLORIDE SCH MG: 1 INJECTION, POWDER, LYOPHILIZED, FOR SOLUTION INTRAVENOUS at 13:35

## 2017-07-02 RX ADMIN — METOPROLOL TARTRATE SCH MG: 25 TABLET, FILM COATED ORAL at 21:02

## 2017-07-02 RX ADMIN — VANCOMYCIN HYDROCHLORIDE SCH MG: 1 INJECTION, POWDER, LYOPHILIZED, FOR SOLUTION INTRAVENOUS at 09:05

## 2017-07-02 RX ADMIN — VANCOMYCIN HYDROCHLORIDE SCH MG: 1 INJECTION, POWDER, LYOPHILIZED, FOR SOLUTION INTRAVENOUS at 17:02

## 2017-07-02 RX ADMIN — LEVOTHYROXINE SODIUM SCH MCG: 75 TABLET ORAL at 06:10

## 2017-07-02 RX ADMIN — SODIUM CHLORIDE TAB 1 GM SCH GM: 1 TAB at 09:07

## 2017-07-02 RX ADMIN — Medication SCH ML: at 13:36

## 2017-07-02 RX ADMIN — PANTOPRAZOLE SCH MG: 40 TABLET, DELAYED RELEASE ORAL at 09:06

## 2017-07-02 RX ADMIN — ACETAMINOPHEN SCH MG: 325 TABLET ORAL at 13:36

## 2017-07-02 RX ADMIN — Medication SCH INTER.UNIT: at 09:06

## 2017-07-02 NOTE — PROGRESS NOTE
Subjective


Date of Service:


Jul 2, 2017.


Subjective


Pt evaluation today including:  conversation w/ patient, physical exam, lab 

review, review of inpatient medication list


Pain:  no pain


PO Intake:  adequate


Voiding:  no voiding problems


RN called this AM, concern for some mild confusion which patient had yesterday 

as well


I talked with patient, she had no evidence of confusion later in the AM, 

oriented x 3


she told me in great detail plans to treat her cataracts and her cornea


when I told her that she was confused for the RN earlier she said "of course I 

was, that board says it is Saturday when it is actually Sunday"





she became tearful describing having difficulty holding her bowels


loose stools back


discussed adding questran and repeat C diff testing


still with tentative plan for going home, needs therapy era





reviewed labs, Na normal at 135, stop NaCl tablet and fluids





Problem List


Medical Problems:


(1) Anemia


Status: Chronic  





(2) Diverticulosis Colon (W/O Ment Of Hemorrhage)


Status: Chronic  





(3) DJD (degenerative joint disease)


Status: Chronic  





(4) Esophageal Reflux


Status: Chronic  





(5) Hypertension


Status: Chronic  





(6) Hypothyroidism, Unspecified


Status: Chronic  





(7) Irritable Bowel Syndrome


Status: Chronic  





(8) Long-Term(Current)Use Of Steroids


Status: Chronic  





(9) Polymyalgia Rheumatica


Status: Chronic  





(10) Rheumatoid Arthritis, Unspecified


Status: Chronic  





(11) Unspecified Urinary Incontinence


Status: Chronic  





(12) Urinary tract infection


Status: Acute  





(13) Vitamin D Deficiency, Unspecified


Status: Chronic  





(14) Vomiting


Status: Acute  





(15) Vomiting and diarrhea


Status: Acute  











Review of Systems


Constitutional:  + weakness


Abdomen:  + diarrhea (difficulty holding bowels, cannot get to bathroom fast 

enough)


All Other Systems:  Reviewed and Negative





Medications





Current Inpatient Medications








 Medications


  (Trade)  Dose


 Ordered  Sig/Robinson


 Route  Start Time


 Stop Time Status Last Admin


Dose Admin


 


 Acetaminophen


  (Tylenol Tab)  650 mg  Q4H  PRN


 PO  6/29/17 21:30


 7/29/17 21:29   


 


 


 Calcium Carbonate


  (Tums Chew Tab)  500 mg  Q4H  PRN


 PO  6/29/17 21:30


 7/29/17 21:29   


 


 


 Levothyroxine


 Sodium


  (Synthroid Tab)  75 mcg  DAILYBB


 PO  6/30/17 06:30


 7/30/17 06:29  7/2/17 06:10


75 MCG


 


 Lisinopril


  (Zestril Tab)  20 mg  BID


 PO  6/30/17 09:00


 7/30/17 08:59  7/2/17 09:06


20 MG


 


 Metoprolol


 Tartrate


  (Lopressor Tab)  25 mg  BID


 PO  6/30/17 09:00


 7/30/17 08:59  7/2/17 09:06


25 MG


 


 Ondansetron HCl


  (Zofran Tab)  4 mg  Q6H  PRN


 PO  6/29/17 21:30


 7/29/17 21:29   


 


 


 Ranitidine HCl


  (zANTac TAB)  150 mg  BID17


 PO  6/30/17 09:00


 7/30/17 08:59  7/2/17 09:06


150 MG


 


 Saccharomyces


 Boulardii


  (Florastor Cap)  250 mg  BID


 PO  6/30/17 09:00


 7/30/17 08:59  7/2/17 09:06


250 MG


 


 Sertraline HCl


  (Zoloft Tab)  25 mg  HS


 PO  6/30/17 21:00


 7/30/17 20:59  7/1/17 20:51


25 MG


 


 Vancomycin HCl


  (Vancomycin Oral


 Soln)  250 mg  QID


 PO  6/29/17 23:30


 7/13/17 23:29  7/2/17 13:35


250 MG


 


 Verapamil HCl


  (Calan-Sr Tab)  120 mg  DAILY


 PO  6/30/17 09:00


 7/30/17 08:59  7/2/17 09:06


120 MG


 


 Acetaminophen


  (Tylenol Tab)  650 mg  TID


 PO  6/30/17 09:00


 7/30/17 08:59  7/2/17 13:36


650 MG


 


 Cholecalciferol


  (Vitamin D Tab)  5,000


 inter.unit  DAILY


 PO  6/30/17 09:00


 7/30/17 08:59  7/2/17 09:06


5,000 INTER.UNIT


 


 Pantoprazole


 Sodium


  (Protonix Tab)  40 mg  DAILY


 PO  6/30/17 09:00


 7/30/17 08:59  7/2/17 09:06


40 MG


 


 Raspberry


  (Raspberry Syrup


 5ml Cup)  5 ml  QID


 PO  6/29/17 23:30


 7/13/17 23:29  7/2/17 13:36


5 ML


 


 Codeine Phosphate/


 Guaifenesin


  (Robitussin-AC


 Sugar Free Syrup)  5 ml  Q6H  PRN


 PO  6/30/17 14:45


 7/30/17 14:44  7/1/17 20:59


5 ML


 


 Prednisone


  (PredniSONE TAB)  10 mg  QAM


 PO  7/2/17 09:00


 8/1/17 08:59  7/2/17 09:47


10 MG


 


 Cholestyramine


 Resin


  (Questran Powder


 Light)  4 gm  BID@10,22


 PO  7/2/17 22:00


 8/1/17 21:59   


 











Objective


Vital Signs











  Date Time  Temp Pulse Resp B/P (MAP) Pulse Ox O2 Delivery O2 Flow Rate FiO2


 


7/2/17 08:00      Room Air  


 


7/2/17 07:52 36.8 61 17 174/82 (112) 96   





    174/74 (107)    


 


7/2/17 00:00      Room Air  


 


7/1/17 23:07 36.5 68 18 153/79 (103) 97 Room Air  


 


7/1/17 21:03  64  129/56 (80)    


 


7/1/17 20:00      Room Air  


 


7/1/17 16:51  69  144/71 (95)    


 


7/1/17 16:00      Room Air  


 


7/1/17 15:34 36.8 59 18 110/45 (66) 94 Room Air  











Physical Exam


General Appearance:  WD/WN, no apparent distress


Eyes:  normal inspection, EOMI, sclerae normal


ENT:  normal ENT inspection, hearing grossly normal, pharynx normal


Neck:  supple, no adenopathy, no JVD, trachea midline


Respiratory/Chest:  chest non-tender, lungs clear, normal breath sounds, no 

respiratory distress, no accessory muscle use


Cardiovascular:  regular rate, rhythm, no edema, no gallop, no JVD, no murmur


Abdomen:  normal bowel sounds, non tender, soft, no organomegaly


Extremities:  normal range of motion, non-tender, normal inspection, no pedal 

edema, no calf tenderness, pelvis stable


Neurologic/Psychiatric:  CNs II-XII nml as tested, alert, normal mood/affect, 

oriented x 3, + motor weakness (generalized)


Skin:  normal color, warm/dry, no rash





Laboratory Results





Last 24 Hours








Test


  7/1/17


15:21 7/2/17


06:03


 


Sodium Level 129 mmol/L  135 mmol/L 


 


Potassium Level 4.0 mmol/L  4.0 mmol/L 


 


Chloride Level 96 mmol/L  101 mmol/L 


 


Carbon Dioxide Level 26 mmol/L  26 mmol/L 


 


Anion Gap 7.0 mmol/L  8.0 mmol/L 


 


Blood Urea Nitrogen 28 mg/dl  25 mg/dl 


 


Creatinine 1.40 mg/dl  1.10 mg/dl 


 


Est Creatinine Clear Calc


Drug Dose 28.2 ml/min 


  35.8 ml/min 


 


 


Estimated GFR (


American) 41.0 


  54.9 


 


 


Estimated GFR (Non-


American 35.4 


  47.4 


 


 


BUN/Creatinine Ratio 20.0  22.4 


 


Random Glucose 160 mg/dl  124 mg/dl 


 


Osmolality 277 mOsm/kg  285 mOsm/kg 


 


Calcium Level 8.6 mg/dl  8.6 mg/dl 











Assessment and Plan


81 year old female currently being treated at [facility] for c.diff and 

uti brought to the ED for severe fatigue and hyponatremia 





Hyponatremia


- sodium in the ED on admission was 115, trended up slowly over past three days

, today is 135


- will d/c NaCl tablet and stop IV fluids, continue 1800cc fluid restriction 

and follow Na level


- low serum osm with appropriately low urine osm on admission, so no signs of 

SIADH


- stop HCTZ indefinitely, may need to wean off SSRI in the future if 

hyponatremia persists


- check labs in the AM





Cough


- dry, due to nasal congestion, post nasal drip


- Robitussin





UTI


- completed course of Cipro





C.Diff


- finished PO maggi Mack returned


- will resume Vanco, check for C diff, add Questran to try to bulk up stools


- has difficulty with stool incontinence because she cannot make it to the 

bathroom in time





HTN


- stop HCTZ with the hyponatremia


- continue lisinopril, metoprolol and verapamil





Hypothyroidism


- continue synthroid





RS3PE/Inflammatory Polyarthritis


- currently not on any medication as hydroxycholoroquine caused the initial 

diarrhea and c.diff


- follows with Dr. Coppola





Depression


- continue sertraline


- if hyponatremia continues to be problematic may need to stop SSRI as well





GERD


- continue prevacid and zantac





PMR


- resume Prednisone 10mg daily





Diet


- regular, 1800cc fluid restriction





DVT prophylaxis


- SCD's and TEDS





Code


- DNR





PT/OT evals, d/c to home with home health once medically stable, likely 

tomorrow but need to get repeat C diff tested

## 2017-07-03 VITALS
DIASTOLIC BLOOD PRESSURE: 72 MMHG | HEART RATE: 65 BPM | OXYGEN SATURATION: 98 % | SYSTOLIC BLOOD PRESSURE: 163 MMHG | TEMPERATURE: 97.88 F

## 2017-07-03 VITALS
DIASTOLIC BLOOD PRESSURE: 72 MMHG | TEMPERATURE: 97.88 F | OXYGEN SATURATION: 98 % | SYSTOLIC BLOOD PRESSURE: 163 MMHG | HEART RATE: 65 BPM

## 2017-07-03 LAB
ANION GAP SERPL CALC-SCNC: 7 MMOL/L (ref 3–11)
BUN SERPL-MCNC: 24 MG/DL (ref 7–18)
BUN/CREAT SERPL: 21.9 (ref 10–20)
CALCIUM SERPL-MCNC: 8.8 MG/DL (ref 8.5–10.1)
CHLORIDE SERPL-SCNC: 106 MMOL/L (ref 98–107)
CO2 SERPL-SCNC: 28 MMOL/L (ref 21–32)
CREAT CL PREDICTED SERPL C-G-VRATE: 35.8 ML/MIN
CREAT SERPL-MCNC: 1.1 MG/DL (ref 0.6–1.2)
GLUCOSE SERPL-MCNC: 87 MG/DL (ref 70–99)
POTASSIUM SERPL-SCNC: 3.7 MMOL/L (ref 3.5–5.1)
SODIUM SERPL-SCNC: 141 MMOL/L (ref 136–145)

## 2017-07-03 RX ADMIN — VANCOMYCIN HYDROCHLORIDE SCH MG: 1 INJECTION, POWDER, LYOPHILIZED, FOR SOLUTION INTRAVENOUS at 09:00

## 2017-07-03 RX ADMIN — LEVOTHYROXINE SODIUM SCH MCG: 75 TABLET ORAL at 06:07

## 2017-07-03 RX ADMIN — Medication SCH INTER.UNIT: at 08:33

## 2017-07-03 RX ADMIN — METOPROLOL TARTRATE SCH MG: 25 TABLET, FILM COATED ORAL at 08:34

## 2017-07-03 RX ADMIN — VERAPAMIL HYDROCHLORIDE SCH MG: 120 TABLET, FILM COATED, EXTENDED RELEASE ORAL at 08:34

## 2017-07-03 RX ADMIN — Medication SCH MG: at 08:32

## 2017-07-03 RX ADMIN — RANITIDINE SCH MG: 150 TABLET ORAL at 08:33

## 2017-07-03 RX ADMIN — Medication SCH ML: at 09:00

## 2017-07-03 RX ADMIN — CHOLESTYRAMINE SCH GM: 4 POWDER, FOR SUSPENSION ORAL at 08:37

## 2017-07-03 RX ADMIN — ACETAMINOPHEN SCH MG: 325 TABLET ORAL at 08:35

## 2017-07-03 RX ADMIN — PANTOPRAZOLE SCH MG: 40 TABLET, DELAYED RELEASE ORAL at 08:32

## 2017-07-03 RX ADMIN — LISINOPRIL SCH MG: 20 TABLET ORAL at 08:32

## 2017-07-03 NOTE — DISCHARGE INSTRUCTIONS
Discharge Instructions


Date of Service


Jul 3, 2017.





Admission


Reason for Admission:  Hyponatremia





Discharge


Discharge Diagnosis / Problem:  hyponatremia, cough, uti





Discharge Goals


Goal(s):  Decrease discomfort, Improve function, Increase independence, Improve 

disease control, Improve nutritional status, Learn about illness, Diagnostic 

testing, Therapeutic intervention, Prevent Disease Progression, Specific goals





Activity Recommendations


Activity Limitations:  resume your previous activity (fall precaution)





.





Instructions / Follow-Up


Instructions / Follow-Up


you have Hyponatremia, one of your blood pressure medicine HCTZ is stop, 


You need to check blood pressure 2 time a day, bring the number of blood 

pressure  to family doctor , and adjust the blood pressure medicine accordingly


You was having C.Diff,  finished PO Vanco, 


I'm giving you Questran to try to bulk up stools, Questran can be stop if you 

have normal stool


You need to have blood testing with PCP to follow up your sodium level 


You need to continue physical therapy at home with home healthcare


- you need to follow up with your primary care physician in 1 week,


- take medication as instructed, never overdose or any misuse, or take with 

alcohol,   because misuse of medicine may  cause organ damage or death, call 

your primary care physician if have questions of medicaitons.


- call your primary care physician OR go to local emergency room if has any 

fever/chill, chest pain, shortness of breathing, nausea/vomiting/abdominal pain

, facial droop/slurry speech/local weakness, or if has any questions. 


- fall precaution


- diet as instructed


- you should understand that it is important to follow up the above instruction

, and "not following the above instruction" may cause delayed or missed care of 

your medical conditions which may cause permanent organ damage and even death.





Current Hospital Diet


Patient's current hospital diet: Regular Diet





Discharge Diet


Recommended Diet:  Regular Diet


Fluid Restriction:  1800 ml (7 cups)





Pending Studies


Studies pending at discharge:  no





Medical Emergencies








.


Who to Call and When:





Medical Emergencies:  If at any time you feel your situation is an emergency, 

please call 911 immediately.





.





Non-Emergent Contact


Non-Emergency issues call your:  Primary Care Provider





.


.








"Provider Documentation" section prepared by Chan Barney.








.





VTE Core Measure


Inpt VTE Proph given/why not?:  T.E.D. Stockings, DAVID's

## 2017-07-03 NOTE — DISCHARGE SUMMARY
Discharge Summary


Date of Service


Jul 3, 2017.





Discharge Summary


Admission Date:


Jun 29, 2017 at 21:34


Discharge Date:  Jul 3, 2017


Principal Diagnosis:   Hyponatremia from multiple factor


Problems/Secondary Diagnoses:


Hypertension 


C. difficile diarrhea 


Cough


UTI


(1) Anemia


Status: Chronic  





(2) Diverticulosis Colon (W/O Ment Of Hemorrhage)


Status: Chronic  





(3) DJD (degenerative joint disease)


Status: Chronic  





(4) Esophageal Reflux


Status: Chronic  





(5) Hypertension


Status: Chronic  





(6) Hypothyroidism, Unspecified


Status: Chronic  





(7) Irritable Bowel Syndrome


Status: Chronic  





(8) Long-Term(Current)Use Of Steroids


Status: Chronic  





(9) Polymyalgia Rheumatica


Status: Chronic  





(10) Rheumatoid Arthritis, Unspecified


Status: Chronic  





(11) Unspecified Urinary Incontinence


Status: Chronic  





(12) Vitamin D Deficiency, Unspecified


Status: Chronic  





Immunizations:  


   Have You Had Influenza Vaccine:  Yes


   History of Tetanus Vaccine?:  Yes


   Tetanus Immunization Date:  Jul 9, 2008


   History of Pneumococcal:  Yes


   Pneumococcal Date:  Jun 9, 2004


   History of Hepatitis B Vaccine:  No


Procedures:


No


Consultations:


No





Medication Reconciliation


New Medications:  


Cholestyramine (Cholestyramine Light) 4 Gm Pack


4 GM PO BID@10,22 for 7 Days





 


Continued Medications:  


Acetaminophen (Tylenol Arthritis Ext Rel) 650 Mg Cplt


650 MG PO TID, CAP





Acetaminophen Tab (Tylenol) 325 Mg Tab


650 MG PO Q4H PRN for Pain or Fever, TAB





Calcium Carbonate (Tums) 500 Mg Chew


1 TAB PO Q4H PRN for Indigestion





Cholecalciferol (Vitamin D) 5,000 Unit Tab


5000 UNITS PO DAILY





Lansoprazole (Prevacid) 30 Mg Cap


30 MG PO DAILY, #90





Levothyroxine Sodium (Synthroid) 75 Mcg Tab


75 MCG PO DAILY, TAB





Lisinopril (Prinivil) 20 Mg Tab


20 MG PO BID, TAB





Melatonin (Kp Melatonin) 3 Mg Tab


6 MG PO HS for 30 Days, #30 TAB





Metoprolol Tartrate (Lopressor) (Lopressor) 25 Mg Tab


25 MG PO BID, TAB





Ondansetron Hcl (Zofran) 4 Mg Tab


4 MG PO Q6H PRN for Nausea, TAB





Prednisone (Prednisone) 10 Mg Tab


10 MG PO QAM, TAB





Ranitidine Hcl (Zantac) 150 Mg Tab


150 MG PO BID17, TAB





Saccharomyces Boulardii (Florastor) 250 Mg Cap


250 MG PO BID





Sertraline (Zoloft) 25 Mg Tab


25 MG PO HS, TAB





Verapamil Hcl (Verapamil Hcl Er) 120 Mg Tab


120 MG PO DAILY, #90





 


Discontinued Medications:  


Ciprofloxacin (Cipro) 250 Mg Tab


250 MG PO BID, TAB


STARTED 6/27/17 FOR 3 DAYS, LAST DOSE 6/30/17-AM DOSE.


Hydrochlorothiazide (Hydrochlorothiazide) 25 Mg Tab


25 MG PO DAILY, #90





Vancomycin Hcl (Vancomycin) 250 Mg Cap


250 MG PO QID


STARTED 6/29/17 @ 2000, FOR 10 DAYS.








Discharge Exam


Doing okay, up and walk to the restroom by self


Has one bowel movement yesterday with soft stool, today has one bowel movement, 

no more diarrhea


Pleasant, conversational, recognizing me because I was taking care of her before


No complaining


Review of Systems:  


   Constitutional:  No fever, No chills, No sweats, No weight loss, No weakness

, No fatigue, No problem reported


   Eyes:  No worsening of vision, No eye pain, No redness, No discharge, No 

diplopia, No problem reported


   ENT:  No hearing loss, No unusual epistaxis, No nasal symptoms, No sore 

throat, No tinnitus, No dental problems, No trouble swallowing, No problem 

reported


   Respiratory:  No cough, No sputum, No wheezing, No shortness of breath, No 

dyspnea on exertion, No dyspnea at rest, No hemoptysis, No problem reported


   Cardiovascular:  No chest pain, No orthopnea, No PND, No edema, No 

claudication, No palpitations, No problem reported


   Abdomen:  No pain, No nausea, No vomiting, No diarrhea, No constipation, No 

GI bleeding, No problem reported


   Musculoskeletal:  No joint pain, No muscle pain, No swelling, No calf pain, 

No problem reported


   Genitourinary - Female:  No dysuria, No urinary frequency, No urinary urgency

, No urinary incontinence, No urinary retention, No hematuria, No dysmenorrhea, 

No menorrhagia, No metrorrhagia, No rash, No vaginal bleeding, No vaginal 

discharge, No vaginal itching, No vulvodynia, No pregnancy, No problem reported


   Neurologic:  No memory loss, No paralysis, No weakness, No numbness/tingling

, No vertigo, No balance problems, No problem reported


   Psychiatric:  No depression symptoms, No anhedonism, No anxiety, No insomnia

, No substance abuse, No problem reported


   Endocrine:  No fatigue, No excessive thirst, No excessive urination, No 

problem reported


   Hematologic / Lymphatic:  No abnormal bleeding/bruising, No clotting problems

, No swollen lymph nodes, No night sweats, No problem reported


   Integumentary:  No rash, No itch, No new/changing skin lesions, No color 

change, No bleeding, No problem reported


Physical Exam:  


   General Appearance:  WD/WN, no apparent distress


   Eyes:  normal inspection, PERRL


   ENT:  normal ENT inspection, hearing grossly normal


   Neck:  supple, no adenopathy


   Respiratory/Chest:  chest non-tender, no respiratory distress, no accessory 

muscle use, + decreased breath sounds


   Cardiovascular:  regular rate, rhythm, no edema, no gallop


   Abdomen / GI:  normal bowel sounds, non tender, soft, no organomegaly, no 

pulsatile mass


   Extremities:  normal inspection, no calf tenderness, normal capillary refill


   Neurologic/Psychiatric:  CNs II-XII nml as tested, no motor/sensory deficits

, alert, normal mood/affect


   Skin:  normal color, warm/dry, no rash





Hospital Course


81 year old female admitted on 06/29/2017 , she had  being treated at [facility] for c.diff and uti was brought to the ED for severe fatigue and 

hyponatremia 





Hyponatremia, I feel it is a multi factorial


- sodium in the ED on admission was 115, trended up slowly over past three days

, continue to be improved and stable, and normalized


-  continue 1800cc fluid restriction and follow Na level


- low serum osm with appropriately low urine osm on admission, so no signs of 

SIADH, likely from HCTZ diuretic, all from recent diarrhea


- stop HCTZ indefinitely, may need to wean off SSRI in the future if 

hyponatremia persists


- Has discharge instructions to follow-up with PCP for another BMP to follow-up 

sodium level





Cough, resolved


- dry, due to nasal congestion, post nasal drip


- Robitussin





UTI


- completed course of Cipro





C.Diff


- finished PO Vanco, dirrhea returned yesterday, but only had 1 stool


- was resumed Vanco, but will  not continue because checked  for C diff was 

negative,  added  Questran to try to bulk up stools, which possible help, but I 

do give introductions to stop Questran if have normal bowel movement, 





HTN


- stop HCTZ with the hyponatremia


- continue lisinopril, metoprolol and verapamil


- Upon discharge his for 2 days, SBP was around 160,, PCP please follow-up, to 

add new blood pressure medicine if needed





Hypothyroidism


- continue synthroid





RS3PE/Inflammatory Polyarthritis


- currently not on any medication as hydroxycholoroquine caused the initial 

diarrhea and c.diff


- follows with Dr. Coppola





Depression


- continue sertraline


- if hyponatremia continues to be problematic may need to stop SSRI as well





GERD


- continue prevacid and zantac





PMR


- resume Prednisone 10mg daily





Diet


- regular, 1800cc fluid restriction





DVT prophylaxis


- SCD's and TEDS





Code


- DNR





PT/OT era, d/c to home with home health once medically stable, 


I discussed with patient in detail about the discharge plan and care plan, I 

called to patient's daughter Kylah about the care plan and discharge plan, they 

all understand and agreed


Talked  to Navigator and  to setting up home health care








Instructions / Follow-Up


you have Hyponatremia, one of your blood pressure medicine HCTZ is stop, 


You need to check blood pressure 2 time a day, bring the number of blood 

pressure  to family doctor , and adjust the blood pressure medicine accordingly


You was having C.Diff,  finished PO Vanco, 


I'm giving you Questran to try to bulk up stools, Questran can be stop if you 

have normal stool


You need to have blood testing with PCP to follow up your sodium level 


You need to continue physical therapy at home with home healthcare


- you need to follow up with your primary care physician in 1 week,


- take medication as instructed, never overdose or any misuse, or take with 

alcohol,   because misuse of medicine may  cause organ damage or death, call 

your primary care physician if have questions of medicaitons.


- call your primary care physician OR go to local emergency room if has any 

fever/chill, chest pain, shortness of breathing, nausea/vomiting/abdominal pain

, facial droop/slurry speech/local weakness, or if has any questions. 


- fall precaution


- diet as instructed


- you should understand that it is important to follow up the above instruction

, and "not following the above instruction" may cause delayed or missed care of 

your medical conditions which may cause permanent organ damage and even death.


Total Time Spent:  Greater than 30 minutes


This includes examination of the patient, discharge planning, medication 

reconciliation, and communication with other providers.





Discharge Instructions


Please refer to the electronic Patient Visit Report (Discharge Instructions) 

for additional information.





Additional Copies To


García Thomas D.O.

## 2017-09-14 ENCOUNTER — HOSPITAL ENCOUNTER (OUTPATIENT)
Dept: HOSPITAL 45 - C.LAB1850 | Age: 82
Discharge: HOME | End: 2017-09-14
Attending: INTERNAL MEDICINE
Payer: COMMERCIAL

## 2017-09-14 DIAGNOSIS — M35.3: Primary | ICD-10-CM

## 2017-10-12 ENCOUNTER — HOSPITAL ENCOUNTER (OUTPATIENT)
Dept: HOSPITAL 45 - C.LAB1850 | Age: 82
Discharge: HOME | End: 2017-10-12
Attending: INTERNAL MEDICINE
Payer: COMMERCIAL

## 2017-10-12 DIAGNOSIS — M35.3: ICD-10-CM

## 2017-10-12 DIAGNOSIS — M65.88: ICD-10-CM

## 2017-10-12 DIAGNOSIS — Z51.81: Primary | ICD-10-CM

## 2017-10-12 DIAGNOSIS — M15.9: ICD-10-CM

## 2017-10-12 DIAGNOSIS — Z79.52: ICD-10-CM

## 2017-11-22 ENCOUNTER — HOSPITAL ENCOUNTER (OUTPATIENT)
Dept: HOSPITAL 45 - C.LAB1850 | Age: 82
Discharge: HOME | End: 2017-11-22
Attending: INTERNAL MEDICINE
Payer: COMMERCIAL

## 2017-11-22 DIAGNOSIS — M35.3: Primary | ICD-10-CM

## 2018-01-04 ENCOUNTER — HOSPITAL ENCOUNTER (OUTPATIENT)
Dept: HOSPITAL 45 - C.LAB1850 | Age: 83
Discharge: HOME | End: 2018-01-04
Attending: INTERNAL MEDICINE
Payer: COMMERCIAL

## 2018-01-04 DIAGNOSIS — M65.88: Primary | ICD-10-CM

## 2018-01-04 DIAGNOSIS — M15.9: ICD-10-CM

## 2018-01-04 DIAGNOSIS — M35.3: ICD-10-CM

## 2018-01-25 ENCOUNTER — HOSPITAL ENCOUNTER (OUTPATIENT)
Dept: HOSPITAL 45 - C.MAMM | Age: 83
Discharge: HOME | End: 2018-01-25
Attending: FAMILY MEDICINE
Payer: COMMERCIAL

## 2018-01-25 DIAGNOSIS — M85.89: Primary | ICD-10-CM

## 2018-01-25 DIAGNOSIS — E28.39: ICD-10-CM

## 2018-02-08 ENCOUNTER — HOSPITAL ENCOUNTER (OUTPATIENT)
Dept: HOSPITAL 45 - C.LAB1850 | Age: 83
Discharge: HOME | End: 2018-02-08
Attending: INTERNAL MEDICINE
Payer: COMMERCIAL

## 2018-02-08 DIAGNOSIS — M35.3: ICD-10-CM

## 2018-02-08 DIAGNOSIS — M65.88: Primary | ICD-10-CM

## 2018-02-08 DIAGNOSIS — K58.9: ICD-10-CM

## 2018-04-13 ENCOUNTER — HOSPITAL ENCOUNTER (OUTPATIENT)
Dept: HOSPITAL 45 - C.LAB1850 | Age: 83
Discharge: HOME | End: 2018-04-13
Attending: INTERNAL MEDICINE
Payer: COMMERCIAL

## 2018-04-13 DIAGNOSIS — M65.88: Primary | ICD-10-CM

## 2018-04-13 DIAGNOSIS — Z79.52: ICD-10-CM

## 2018-04-13 DIAGNOSIS — M35.3: ICD-10-CM

## 2018-04-16 ENCOUNTER — HOSPITAL ENCOUNTER (OUTPATIENT)
Dept: HOSPITAL 45 - C.LAB1850 | Age: 83
Discharge: HOME | End: 2018-04-16
Attending: INTERNAL MEDICINE
Payer: COMMERCIAL

## 2018-04-16 DIAGNOSIS — M70.61: ICD-10-CM

## 2018-04-16 DIAGNOSIS — M65.88: Primary | ICD-10-CM

## 2018-04-16 DIAGNOSIS — M81.0: ICD-10-CM

## 2018-04-16 DIAGNOSIS — M35.3: ICD-10-CM

## 2018-04-16 DIAGNOSIS — E55.9: ICD-10-CM

## 2018-04-16 DIAGNOSIS — Z79.52: ICD-10-CM

## 2018-07-31 ENCOUNTER — HOSPITAL ENCOUNTER (OUTPATIENT)
Dept: HOSPITAL 45 - C.LAB1850 | Age: 83
Discharge: HOME | End: 2018-07-31
Attending: INTERNAL MEDICINE
Payer: COMMERCIAL

## 2018-07-31 DIAGNOSIS — M65.88: Primary | ICD-10-CM

## 2018-07-31 DIAGNOSIS — M35.3: ICD-10-CM

## 2020-09-23 NOTE — GASTROINTESTINAL CONSULTATION
DATE OF CONSULTATION:  06/11/2017

 

REASON FOR CONSULTATION:  C. diff colitis, anemia and heme-positive stool.

 

HISTORY OF PRESENT ILLNESS:  The patient is an 81-year-old female from

[place] with polymyalgia rheumatica and polyarthritis syndrome who

presented to the hospital with nausea, vomiting, diarrhea and weakness on

June 5th.  The patient has been started on hydroxychloroquine for her

inflammatory arthritis and apparently was not tolerating it well, was held

and then started back and the same symptoms came back.  She was admitted to

the hospital for the above symptoms and on June 7th was found to have

positive C. diff.  She was initially treated with Flagyl, but then today

since she was not improving she was switched to vancomycin 125 mg 4 times a

day.  Of note, is that the patient has been getting ceftriaxone for urinary

tract infection from E. coli.  She is also on a probiotic.  She had a CT scan

of the abdomen that showed diverticulosis, but no other colonic

abnormalities.  She did have a colonoscopy about 5 years ago which has showed

diverticulosis.

 

PAST MEDICAL HISTORY:  Remarkable for anemia of chronic disease, C. diff

colitis, this is her first episode; diverticulosis, degenerative joint

disease, inflammatory arthropathy, polymyalgia rheumatica, hypertension,

hypothyroidism, irritable bowel, renal insufficiency, history of hysterectomy

and rectocele repair.

 

MEDICATIONS:  Vitamin D, hydrochlorothiazide, hydroxychloroquine, Prevacid,

Synthroid, Prinivil, melatonin, prednisone, Zoloft and verapamil.

 

ALLERGIES:  SULFA AND ERYTHROMYCIN.

 

FAMILY HISTORY:  Positive for heart disease, hypertension.

 

SOCIAL HISTORY:  The patient is a resident of [place], lives with her

, does not smoke.  She is retired.

 

REVIEW OF SYSTEMS:  Positive for fatigue and diarrhea.  Remainder is

negative.

 

PHYSICAL EXAMINATION:

GENERAL:  The patient is overweight, in no acute distress.

VITAL SIGNS:  Blood pressure is 150/85, pulse ox is 98 on room air, pulse 70

and regular.

ABDOMEN:  Shows a low transverse scar.  There are no masses, tenderness, or

hepatosplenomegaly.

LUNGS:  Clear.

HEART:  Showed a normal S1 and S2, regular rate and rhythm without murmurs,

rubs, or gallops.

 

LABORATORY:  Shows hemoglobin of 7.9, white count 7.22, platelets are 339. 

B12 and folate are normal.  MCV is normal.

 

IMPRESSION:  The patient has Clostridium difficile colitis, just starting

vancomycin today.  Because of the patient's compromised status, I would have

preferred that she was started on vancomycin initially, she will need to be

on the vancomycin at least 10 days following the termination of her

ceftriaxone for urinary tract infection.  I agree with keeping her on a

probiotic for now to help crowd out the Clostridium difficile. She is at a

25% risk of recurrence of disease after treatment to determination of spores.

 The Clostridium difficile colitis is most likely the source for her

heme-positive stool and I will look back at her records to find out when her

last colonoscopy was but certainly we do not want to do it now while she is

acutely infected.  Her anemia is normochromic normocytic and most likely

related to her polymyalgia rheumatica.  We will follow the patient during her

hospital stay.
: Yes